# Patient Record
Sex: FEMALE | Race: WHITE | NOT HISPANIC OR LATINO | URBAN - METROPOLITAN AREA
[De-identification: names, ages, dates, MRNs, and addresses within clinical notes are randomized per-mention and may not be internally consistent; named-entity substitution may affect disease eponyms.]

---

## 2021-03-04 ENCOUNTER — OUTPATIENT (OUTPATIENT)
Dept: OUTPATIENT SERVICES | Facility: HOSPITAL | Age: 56
LOS: 1 days | End: 2021-03-04
Payer: COMMERCIAL

## 2021-03-04 VITALS
WEIGHT: 156.53 LBS | RESPIRATION RATE: 18 BRPM | DIASTOLIC BLOOD PRESSURE: 88 MMHG | SYSTOLIC BLOOD PRESSURE: 130 MMHG | HEIGHT: 64 IN | TEMPERATURE: 97 F | HEART RATE: 67 BPM

## 2021-03-04 DIAGNOSIS — F17.200 NICOTINE DEPENDENCE, UNSPECIFIED, UNCOMPLICATED: ICD-10-CM

## 2021-03-04 DIAGNOSIS — Z98.890 OTHER SPECIFIED POSTPROCEDURAL STATES: Chronic | ICD-10-CM

## 2021-03-04 DIAGNOSIS — Z90.721 ACQUIRED ABSENCE OF OVARIES, UNILATERAL: Chronic | ICD-10-CM

## 2021-03-04 DIAGNOSIS — Z01.818 ENCOUNTER FOR OTHER PREPROCEDURAL EXAMINATION: ICD-10-CM

## 2021-03-04 DIAGNOSIS — M48.061 SPINAL STENOSIS, LUMBAR REGION WITHOUT NEUROGENIC CLAUDICATION: ICD-10-CM

## 2021-03-04 DIAGNOSIS — R03.0 ELEVATED BLOOD-PRESSURE READING, WITHOUT DIAGNOSIS OF HYPERTENSION: ICD-10-CM

## 2021-03-04 DIAGNOSIS — Z29.9 ENCOUNTER FOR PROPHYLACTIC MEASURES, UNSPECIFIED: ICD-10-CM

## 2021-03-04 LAB
A1C WITH ESTIMATED AVERAGE GLUCOSE RESULT: 5.5 % — SIGNIFICANT CHANGE UP (ref 4–5.6)
ANION GAP SERPL CALC-SCNC: 15 MMOL/L — SIGNIFICANT CHANGE UP (ref 5–17)
APTT BLD: 34.9 SEC — SIGNIFICANT CHANGE UP (ref 27.5–35.5)
BASOPHILS # BLD AUTO: 0.12 K/UL — SIGNIFICANT CHANGE UP (ref 0–0.2)
BASOPHILS NFR BLD AUTO: 1.5 % — SIGNIFICANT CHANGE UP (ref 0–2)
BLD GP AB SCN SERPL QL: SIGNIFICANT CHANGE UP
BUN SERPL-MCNC: 15 MG/DL — SIGNIFICANT CHANGE UP (ref 8–20)
CALCIUM SERPL-MCNC: 9.9 MG/DL — SIGNIFICANT CHANGE UP (ref 8.6–10.2)
CHLORIDE SERPL-SCNC: 95 MMOL/L — LOW (ref 98–107)
CO2 SERPL-SCNC: 28 MMOL/L — SIGNIFICANT CHANGE UP (ref 22–29)
CREAT SERPL-MCNC: 0.62 MG/DL — SIGNIFICANT CHANGE UP (ref 0.5–1.3)
EOSINOPHIL # BLD AUTO: 0.34 K/UL — SIGNIFICANT CHANGE UP (ref 0–0.5)
EOSINOPHIL NFR BLD AUTO: 4.3 % — SIGNIFICANT CHANGE UP (ref 0–6)
ESTIMATED AVERAGE GLUCOSE: 111 MG/DL — SIGNIFICANT CHANGE UP (ref 68–114)
GLUCOSE SERPL-MCNC: 93 MG/DL — SIGNIFICANT CHANGE UP (ref 70–99)
HCT VFR BLD CALC: 46.7 % — HIGH (ref 34.5–45)
HGB BLD-MCNC: 14.7 G/DL — SIGNIFICANT CHANGE UP (ref 11.5–15.5)
IMM GRANULOCYTES NFR BLD AUTO: 0.3 % — SIGNIFICANT CHANGE UP (ref 0–1.5)
INR BLD: 0.91 RATIO — SIGNIFICANT CHANGE UP (ref 0.88–1.16)
LYMPHOCYTES # BLD AUTO: 3.43 K/UL — HIGH (ref 1–3.3)
LYMPHOCYTES # BLD AUTO: 43 % — SIGNIFICANT CHANGE UP (ref 13–44)
MCHC RBC-ENTMCNC: 29.8 PG — SIGNIFICANT CHANGE UP (ref 27–34)
MCHC RBC-ENTMCNC: 31.5 GM/DL — LOW (ref 32–36)
MCV RBC AUTO: 94.7 FL — SIGNIFICANT CHANGE UP (ref 80–100)
MONOCYTES # BLD AUTO: 0.55 K/UL — SIGNIFICANT CHANGE UP (ref 0–0.9)
MONOCYTES NFR BLD AUTO: 6.9 % — SIGNIFICANT CHANGE UP (ref 2–14)
MRSA PCR RESULT.: SIGNIFICANT CHANGE UP
NEUTROPHILS # BLD AUTO: 3.51 K/UL — SIGNIFICANT CHANGE UP (ref 1.8–7.4)
NEUTROPHILS NFR BLD AUTO: 44 % — SIGNIFICANT CHANGE UP (ref 43–77)
PLATELET # BLD AUTO: 267 K/UL — SIGNIFICANT CHANGE UP (ref 150–400)
POTASSIUM SERPL-MCNC: 4.6 MMOL/L — SIGNIFICANT CHANGE UP (ref 3.5–5.3)
POTASSIUM SERPL-SCNC: 4.6 MMOL/L — SIGNIFICANT CHANGE UP (ref 3.5–5.3)
PROTHROM AB SERPL-ACNC: 10.6 SEC — SIGNIFICANT CHANGE UP (ref 10.6–13.6)
RBC # BLD: 4.93 M/UL — SIGNIFICANT CHANGE UP (ref 3.8–5.2)
RBC # FLD: 12.9 % — SIGNIFICANT CHANGE UP (ref 10.3–14.5)
S AUREUS DNA NOSE QL NAA+PROBE: DETECTED
SODIUM SERPL-SCNC: 138 MMOL/L — SIGNIFICANT CHANGE UP (ref 135–145)
WBC # BLD: 7.97 K/UL — SIGNIFICANT CHANGE UP (ref 3.8–10.5)
WBC # FLD AUTO: 7.97 K/UL — SIGNIFICANT CHANGE UP (ref 3.8–10.5)

## 2021-03-04 PROCEDURE — 71046 X-RAY EXAM CHEST 2 VIEWS: CPT

## 2021-03-04 PROCEDURE — 71046 X-RAY EXAM CHEST 2 VIEWS: CPT | Mod: 26

## 2021-03-04 PROCEDURE — G0463: CPT

## 2021-03-04 PROCEDURE — 93010 ELECTROCARDIOGRAM REPORT: CPT

## 2021-03-04 PROCEDURE — 93005 ELECTROCARDIOGRAM TRACING: CPT
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
## 2021-03-04 NOTE — H&P PST ADULT - NSICDXPASTSURGICALHX_GEN_ALL_CORE_FT
PAST SURGICAL HISTORY:  H/O neck surgery     H/O unilateral oophorectomy     S/P lumbar spine operation 2015, 2017

## 2021-03-04 NOTE — H&P PST ADULT - NSICDXPROBLEM_GEN_ALL_CORE_FT
PROBLEM DIAGNOSES  Problem: Lumbar stenosis without neurogenic claudication  Assessment and Plan: Patient is scheduled for transforaminal lumbar interbody fusion, posterior spinal fsion, laminectomy L4-S1 with Dr Bakari Gonzalez on 3/18/21. Medical clearance pending    Problem: Elevated blood pressure reading  Assessment and Plan: /88, patient in 10/10 pain at time of visit, denies history of hypertension, bp recheck at medical clearance appt    Problem: Tobacco dependence  Assessment and Plan: Patient instructed to stop smoking 2 weeks prior to surgery, patient states she is speaking with her PCP regarding chantix, patient reports she is down to 3 cigarettes per day.  Urine metabolites pending. Lungs +wheezing right lower lobe, cxr pending    Problem: Need for prophylactic measure  Assessment and Plan: Caprini Score 4, at risk, surgical team to order appropriate vte prophylaxis

## 2021-03-04 NOTE — H&P PST ADULT - BACK COMMENTS
lumbar spine tender, decreased ROM with flexion/extension/ rotation/lateral bending  no cva tenderness

## 2021-03-04 NOTE — H&P PST ADULT - ANESTHESIA, PREVIOUS REACTION, PROFILE
'medically induced panic attawck 1 week after surgery in 2017'/none 'medically induced panic attack 1 week after surgery in 2017'/none
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
## 2021-03-05 RX ORDER — MUPIROCIN 20 MG/G
1 OINTMENT TOPICAL
Qty: 1 | Refills: 0
Start: 2021-03-05 | End: 2021-03-09

## 2021-03-06 PROBLEM — M48.061 SPINAL STENOSIS, LUMBAR REGION WITHOUT NEUROGENIC CLAUDICATION: Chronic | Status: ACTIVE | Noted: 2021-03-04

## 2021-03-08 RX ORDER — MUPIROCIN 20 MG/G
1 OINTMENT TOPICAL
Qty: 1 | Refills: 0
Start: 2021-03-08 | End: 2021-03-12

## 2021-03-09 LAB
ANABASINE UR-MCNC: SIGNIFICANT CHANGE UP NG/ML
COTININE UR-MCNC: >1200 NG/ML — HIGH
NICOTINE UR-MCNC: >1200 NG/ML — HIGH
NORNICOTINE UR-MCNC: 58 NG/ML — HIGH

## 2021-03-14 DIAGNOSIS — Z01.818 ENCOUNTER FOR OTHER PREPROCEDURAL EXAMINATION: ICD-10-CM

## 2021-03-14 PROBLEM — Z00.00 ENCOUNTER FOR PREVENTIVE HEALTH EXAMINATION: Status: ACTIVE | Noted: 2021-03-14

## 2021-03-15 ENCOUNTER — APPOINTMENT (OUTPATIENT)
Dept: DISASTER EMERGENCY | Facility: CLINIC | Age: 56
End: 2021-03-15

## 2021-03-16 ENCOUNTER — TRANSCRIPTION ENCOUNTER (OUTPATIENT)
Age: 56
End: 2021-03-16

## 2021-03-16 LAB — SARS-COV-2 N GENE NPH QL NAA+PROBE: NOT DETECTED

## 2021-03-17 ENCOUNTER — TRANSCRIPTION ENCOUNTER (OUTPATIENT)
Age: 56
End: 2021-03-17

## 2021-03-18 ENCOUNTER — TRANSCRIPTION ENCOUNTER (OUTPATIENT)
Age: 56
End: 2021-03-18

## 2021-03-18 ENCOUNTER — INPATIENT (INPATIENT)
Facility: HOSPITAL | Age: 56
LOS: 1 days | Discharge: HOME | DRG: 455 | End: 2021-03-20
Attending: SPECIALIST | Admitting: SPECIALIST
Payer: COMMERCIAL

## 2021-03-18 VITALS
TEMPERATURE: 97 F | DIASTOLIC BLOOD PRESSURE: 93 MMHG | SYSTOLIC BLOOD PRESSURE: 133 MMHG | OXYGEN SATURATION: 100 % | WEIGHT: 156.53 LBS | HEART RATE: 75 BPM | HEIGHT: 64 IN | RESPIRATION RATE: 18 BRPM

## 2021-03-18 DIAGNOSIS — M48.061 SPINAL STENOSIS, LUMBAR REGION WITHOUT NEUROGENIC CLAUDICATION: ICD-10-CM

## 2021-03-18 DIAGNOSIS — Z90.721 ACQUIRED ABSENCE OF OVARIES, UNILATERAL: Chronic | ICD-10-CM

## 2021-03-18 DIAGNOSIS — Z98.890 OTHER SPECIFIED POSTPROCEDURAL STATES: Chronic | ICD-10-CM

## 2021-03-18 LAB — ABO RH CONFIRMATION: SIGNIFICANT CHANGE UP

## 2021-03-18 RX ORDER — LANOLIN ALCOHOL/MO/W.PET/CERES
1 CREAM (GRAM) TOPICAL
Qty: 0 | Refills: 0 | DISCHARGE

## 2021-03-18 RX ORDER — CEFAZOLIN SODIUM 1 G
1000 VIAL (EA) INJECTION EVERY 8 HOURS
Refills: 0 | Status: DISCONTINUED | OUTPATIENT
Start: 2021-03-18 | End: 2021-03-20

## 2021-03-18 RX ORDER — ACETAMINOPHEN 500 MG
650 TABLET ORAL EVERY 6 HOURS
Refills: 0 | Status: DISCONTINUED | OUTPATIENT
Start: 2021-03-18 | End: 2021-03-20

## 2021-03-18 RX ORDER — OXYCODONE AND ACETAMINOPHEN 5; 325 MG/1; MG/1
1 TABLET ORAL
Qty: 0 | Refills: 0 | DISCHARGE

## 2021-03-18 RX ORDER — SODIUM CHLORIDE 9 MG/ML
3 INJECTION INTRAMUSCULAR; INTRAVENOUS; SUBCUTANEOUS EVERY 8 HOURS
Refills: 0 | Status: DISCONTINUED | OUTPATIENT
Start: 2021-03-18 | End: 2021-03-18

## 2021-03-18 RX ORDER — CHOLECALCIFEROL (VITAMIN D3) 125 MCG
0 CAPSULE ORAL
Qty: 0 | Refills: 0 | DISCHARGE

## 2021-03-18 RX ORDER — SENNA PLUS 8.6 MG/1
2 TABLET ORAL AT BEDTIME
Refills: 0 | Status: DISCONTINUED | OUTPATIENT
Start: 2021-03-18 | End: 2021-03-20

## 2021-03-18 RX ORDER — LANOLIN ALCOHOL/MO/W.PET/CERES
10 CREAM (GRAM) TOPICAL AT BEDTIME
Refills: 0 | Status: DISCONTINUED | OUTPATIENT
Start: 2021-03-18 | End: 2021-03-20

## 2021-03-18 RX ORDER — HYDROMORPHONE HYDROCHLORIDE 2 MG/ML
30 INJECTION INTRAMUSCULAR; INTRAVENOUS; SUBCUTANEOUS
Refills: 0 | Status: DISCONTINUED | OUTPATIENT
Start: 2021-03-18 | End: 2021-03-19

## 2021-03-18 RX ORDER — DULOXETINE HYDROCHLORIDE 30 MG/1
40 CAPSULE, DELAYED RELEASE ORAL
Qty: 0 | Refills: 0 | DISCHARGE

## 2021-03-18 RX ORDER — CEFAZOLIN SODIUM 1 G
2000 VIAL (EA) INJECTION ONCE
Refills: 0 | Status: DISCONTINUED | OUTPATIENT
Start: 2021-03-18 | End: 2021-03-18

## 2021-03-18 RX ORDER — ONDANSETRON 8 MG/1
4 TABLET, FILM COATED ORAL EVERY 6 HOURS
Refills: 0 | Status: DISCONTINUED | OUTPATIENT
Start: 2021-03-18 | End: 2021-03-20

## 2021-03-18 RX ORDER — SODIUM CHLORIDE 9 MG/ML
1000 INJECTION, SOLUTION INTRAVENOUS
Refills: 0 | Status: DISCONTINUED | OUTPATIENT
Start: 2021-03-18 | End: 2021-03-18

## 2021-03-18 RX ORDER — NALOXONE HYDROCHLORIDE 4 MG/.1ML
0.1 SPRAY NASAL
Refills: 0 | Status: DISCONTINUED | OUTPATIENT
Start: 2021-03-18 | End: 2021-03-20

## 2021-03-18 RX ORDER — SODIUM CHLORIDE 9 MG/ML
1000 INJECTION, SOLUTION INTRAVENOUS
Refills: 0 | Status: DISCONTINUED | OUTPATIENT
Start: 2021-03-18 | End: 2021-03-20

## 2021-03-18 RX ORDER — DULOXETINE HYDROCHLORIDE 30 MG/1
40 CAPSULE, DELAYED RELEASE ORAL DAILY
Refills: 0 | Status: DISCONTINUED | OUTPATIENT
Start: 2021-03-18 | End: 2021-03-20

## 2021-03-18 RX ORDER — HYDROMORPHONE HYDROCHLORIDE 2 MG/ML
0.5 INJECTION INTRAMUSCULAR; INTRAVENOUS; SUBCUTANEOUS
Refills: 0 | Status: DISCONTINUED | OUTPATIENT
Start: 2021-03-18 | End: 2021-03-18

## 2021-03-18 RX ADMIN — HYDROMORPHONE HYDROCHLORIDE 0.5 MILLIGRAM(S): 2 INJECTION INTRAMUSCULAR; INTRAVENOUS; SUBCUTANEOUS at 18:00

## 2021-03-18 RX ADMIN — HYDROMORPHONE HYDROCHLORIDE 30 MILLILITER(S): 2 INJECTION INTRAMUSCULAR; INTRAVENOUS; SUBCUTANEOUS at 17:00

## 2021-03-18 RX ADMIN — Medication 100 MILLIGRAM(S): at 23:03

## 2021-03-18 RX ADMIN — HYDROMORPHONE HYDROCHLORIDE 30 MILLILITER(S): 2 INJECTION INTRAMUSCULAR; INTRAVENOUS; SUBCUTANEOUS at 18:41

## 2021-03-18 RX ADMIN — SENNA PLUS 2 TABLET(S): 8.6 TABLET ORAL at 23:02

## 2021-03-18 RX ADMIN — Medication 10 MILLIGRAM(S): at 23:02

## 2021-03-18 RX ADMIN — HYDROMORPHONE HYDROCHLORIDE 0.5 MILLIGRAM(S): 2 INJECTION INTRAMUSCULAR; INTRAVENOUS; SUBCUTANEOUS at 16:59

## 2021-03-18 RX ADMIN — HYDROMORPHONE HYDROCHLORIDE 30 MILLILITER(S): 2 INJECTION INTRAMUSCULAR; INTRAVENOUS; SUBCUTANEOUS at 21:56

## 2021-03-18 RX ADMIN — HYDROMORPHONE HYDROCHLORIDE 30 MILLILITER(S): 2 INJECTION INTRAMUSCULAR; INTRAVENOUS; SUBCUTANEOUS at 19:43

## 2021-03-18 NOTE — BRIEF OPERATIVE NOTE - NSICDXBRIEFPREOP_GEN_ALL_CORE_FT
PRE-OP DIAGNOSIS:  Lumbar spondylosis 18-Mar-2021 15:57:59  Papa Gamboa  Recurrent herniation of lumbar disc 18-Mar-2021 15:57:29  Papa Gamboa

## 2021-03-18 NOTE — BRIEF OPERATIVE NOTE - NSICDXBRIEFPROCEDURE_GEN_ALL_CORE_FT
PROCEDURES:  Revision of lumbar laminectomy 18-Mar-2021 15:59:11  Papa Gamboa  Fusion, spine, lumbar, TLIF, 1-2 levels 18-Mar-2021 15:58:42  Papa Gamboa

## 2021-03-18 NOTE — DISCHARGE NOTE PROVIDER - HOSPITAL COURSE
Patient was admitted for elective revision lumbar laminectomy, transforaminal lumbar interbody fusion on 3/18 for failed conservative treatment of persistent lower back pain, The hospital post operative course was uneventful.  The surgical dressing was changed and the drain was removed uneventfully.   PT/OT worked with patient for gait training.   Chronic medical conditions were treated during the hospital stay. Patient was followed by Pain Management for pain control. Pain was now adequately controlled and patient was discharged home in stable condition.

## 2021-03-18 NOTE — PROGRESS NOTE ADULT - SUBJECTIVE AND OBJECTIVE BOX
SHIKHA [id number]    55yFemale s/p TLIF L4-S1, POD#0    Spinal stenosis of lumbar region without neurogenic claudication                SUBJECTIVE: Patient seen and examined at the bedside. Patient reports significant pain to lower back. Patient moving in bed constantly stating she can't get comfortable. Of note, when I initially arrived patient was sitting comfortably eating and started moving upon my arrival. Patient stating PCA pump is not controlling her pain currently. Pain management on board. Patient has not ambulated with PT yet. Patient states she gets pain that radiates up her left shin as per baseline, unchanged post-operatively. Patient denies acute motor or sensory changes. Denies CP, SOB, dizziness, HA.       OBJECTIVE:   Vital Signs Last 24 Hrs  T(C): 36.4 (18 Mar 2021 19:35), Max: 36.6 (18 Mar 2021 16:23)  T(F): 97.5 (18 Mar 2021 19:35), Max: 97.9 (18 Mar 2021 16:23)  HR: 94 (18 Mar 2021 19:35) (75 - 94)  BP: 119/74 (18 Mar 2021 19:35) (97/78 - 133/93)  BP(mean): --  RR: 18 (18 Mar 2021 19:35) (13 - 20)  SpO2: 93% (18 Mar 2021 19:35) (93% - 100%)\par       PE:  Constitutional: Alert, awake, appears uncomfortable, can't sit still    Spine:          Dressing: [x] clean/dry/intact           Drains:  HV drain present with bloody fluid in chamber           Sensation: SILT b/l L2-S1                     Motor: exam limited secondary to pain                                                         HF(l2)   KE(l3)    TA(l4)   EHL(l5)    GS(s1)                                                 R        4/5        5/5        5/5       5/5         5/5                                               L         4/5        5/5       5/5       5/5          5/5                                                 B/L LE: warm well perfused; capillary refill <3 seconds. BCR. Calves soft NT          A/P :  55y Female S/P TLIF L4-S1,  POD#0  -    Pain control as per pain management, PCA  -    DVT ppx: [x]SCDs[ ] Pharmacolgic    -    Check AM labs    -    Monitor Drain Output, ABX until drain removed  -    Dinh removal after PT in AM  -    Resume home meds  -    Physical Therapy  -    Weight bearing status: WBAT  -    Dispo planning

## 2021-03-18 NOTE — BRIEF OPERATIVE NOTE - NSICDXBRIEFPOSTOP_GEN_ALL_CORE_FT
POST-OP DIAGNOSIS:  Lumbar spondylosis 18-Mar-2021 15:58:14  Papa Gamboa  Recurrent herniation of lumbar disc 18-Mar-2021 15:57:42  Papa Gamboa

## 2021-03-18 NOTE — DISCHARGE NOTE PROVIDER - NSDCMRMEDTOKEN_GEN_ALL_CORE_FT
Cymbalta: 40 milligram(s) orally once a day  Melatonin 10 mg oral capsule: 1 cap(s) orally once a day (at bedtime)  mupirocin 2% topical ointment: Apply topically to affected area 2 times a day   Percocet 5 mg-325 mg oral tablet: 1 tab(s) orally every 6 hours  Vitamin D3 5000 intl units (125 mcg) oral capsule:

## 2021-03-18 NOTE — DISCHARGE NOTE PROVIDER - NSDCCPCAREPLAN_GEN_ALL_CORE_FT
PRINCIPAL DISCHARGE DIAGNOSIS  Diagnosis: Lumbar stenosis without neurogenic claudication  Assessment and Plan of Treatment:

## 2021-03-18 NOTE — DISCHARGE NOTE PROVIDER - NSDCCPTREATMENT_GEN_ALL_CORE_FT
PRINCIPAL PROCEDURE  Procedure: Revision of lumbar laminectomy  Findings and Treatment:       SECONDARY PROCEDURE  Procedure: Fusion, spine, lumbar, TLIF, 1-2 levels  Findings and Treatment:

## 2021-03-18 NOTE — DISCHARGE NOTE PROVIDER - NSDCFUADDINST_GEN_ALL_CORE_FT
Follow-up with Dr. Gonzalez within 7-10 days. Dressing change daily until dry, then leave dressing in place until office follow-up.  Showering at 48 hour is permitted.  Pain medications as indicated and titrated to patient's needs. Ambulate with assistance of walker. Contact office if the wound becomes red, opens or discharge increases.

## 2021-03-19 DIAGNOSIS — G89.18 OTHER ACUTE POSTPROCEDURAL PAIN: ICD-10-CM

## 2021-03-19 LAB
ANION GAP SERPL CALC-SCNC: 12 MMOL/L — SIGNIFICANT CHANGE UP (ref 5–17)
BASOPHILS # BLD AUTO: 0 K/UL — SIGNIFICANT CHANGE UP (ref 0–0.2)
BASOPHILS NFR BLD AUTO: 0 % — SIGNIFICANT CHANGE UP (ref 0–2)
BUN SERPL-MCNC: 9 MG/DL — SIGNIFICANT CHANGE UP (ref 8–20)
CALCIUM SERPL-MCNC: 8.5 MG/DL — LOW (ref 8.6–10.2)
CHLORIDE SERPL-SCNC: 100 MMOL/L — SIGNIFICANT CHANGE UP (ref 98–107)
CO2 SERPL-SCNC: 27 MMOL/L — SIGNIFICANT CHANGE UP (ref 22–29)
CREAT SERPL-MCNC: 0.48 MG/DL — LOW (ref 0.5–1.3)
EOSINOPHIL # BLD AUTO: 0 K/UL — SIGNIFICANT CHANGE UP (ref 0–0.5)
EOSINOPHIL NFR BLD AUTO: 0 % — SIGNIFICANT CHANGE UP (ref 0–6)
GIANT PLATELETS BLD QL SMEAR: PRESENT — SIGNIFICANT CHANGE UP
GLUCOSE SERPL-MCNC: 113 MG/DL — HIGH (ref 70–99)
HCT VFR BLD CALC: 37.6 % — SIGNIFICANT CHANGE UP (ref 34.5–45)
HGB BLD-MCNC: 12.1 G/DL — SIGNIFICANT CHANGE UP (ref 11.5–15.5)
LYMPHOCYTES # BLD AUTO: 1.31 K/UL — SIGNIFICANT CHANGE UP (ref 1–3.3)
LYMPHOCYTES # BLD AUTO: 8.7 % — LOW (ref 13–44)
MANUAL SMEAR VERIFICATION: SIGNIFICANT CHANGE UP
MCHC RBC-ENTMCNC: 30.5 PG — SIGNIFICANT CHANGE UP (ref 27–34)
MCHC RBC-ENTMCNC: 32.2 GM/DL — SIGNIFICANT CHANGE UP (ref 32–36)
MCV RBC AUTO: 94.7 FL — SIGNIFICANT CHANGE UP (ref 80–100)
MONOCYTES # BLD AUTO: 0.78 K/UL — SIGNIFICANT CHANGE UP (ref 0–0.9)
MONOCYTES NFR BLD AUTO: 5.2 % — SIGNIFICANT CHANGE UP (ref 2–14)
NEUTROPHILS # BLD AUTO: 12.68 K/UL — HIGH (ref 1.8–7.4)
NEUTROPHILS NFR BLD AUTO: 84.4 % — HIGH (ref 43–77)
PLAT MORPH BLD: NORMAL — SIGNIFICANT CHANGE UP
PLATELET # BLD AUTO: 247 K/UL — SIGNIFICANT CHANGE UP (ref 150–400)
POTASSIUM SERPL-MCNC: 4.1 MMOL/L — SIGNIFICANT CHANGE UP (ref 3.5–5.3)
POTASSIUM SERPL-SCNC: 4.1 MMOL/L — SIGNIFICANT CHANGE UP (ref 3.5–5.3)
RBC # BLD: 3.97 M/UL — SIGNIFICANT CHANGE UP (ref 3.8–5.2)
RBC # FLD: 13.2 % — SIGNIFICANT CHANGE UP (ref 10.3–14.5)
RBC BLD AUTO: NORMAL — SIGNIFICANT CHANGE UP
SODIUM SERPL-SCNC: 139 MMOL/L — SIGNIFICANT CHANGE UP (ref 135–145)
VARIANT LYMPHS # BLD: 1.7 % — SIGNIFICANT CHANGE UP (ref 0–6)
WBC # BLD: 15.02 K/UL — HIGH (ref 3.8–10.5)
WBC # FLD AUTO: 15.02 K/UL — HIGH (ref 3.8–10.5)

## 2021-03-19 RX ORDER — HYDROMORPHONE HYDROCHLORIDE 2 MG/ML
0.5 INJECTION INTRAMUSCULAR; INTRAVENOUS; SUBCUTANEOUS
Refills: 0 | Status: DISCONTINUED | OUTPATIENT
Start: 2021-03-19 | End: 2021-03-20

## 2021-03-19 RX ORDER — OXYCODONE HYDROCHLORIDE 5 MG/1
5 TABLET ORAL
Refills: 0 | Status: DISCONTINUED | OUTPATIENT
Start: 2021-03-19 | End: 2021-03-20

## 2021-03-19 RX ORDER — OXYCODONE HYDROCHLORIDE 5 MG/1
10 TABLET ORAL
Refills: 0 | Status: DISCONTINUED | OUTPATIENT
Start: 2021-03-19 | End: 2021-03-20

## 2021-03-19 RX ORDER — HYDROMORPHONE HYDROCHLORIDE 2 MG/ML
4 INJECTION INTRAMUSCULAR; INTRAVENOUS; SUBCUTANEOUS
Refills: 0 | Status: DISCONTINUED | OUTPATIENT
Start: 2021-03-19 | End: 2021-03-20

## 2021-03-19 RX ADMIN — HYDROMORPHONE HYDROCHLORIDE 0.5 MILLIGRAM(S): 2 INJECTION INTRAMUSCULAR; INTRAVENOUS; SUBCUTANEOUS at 10:55

## 2021-03-19 RX ADMIN — HYDROMORPHONE HYDROCHLORIDE 0.5 MILLIGRAM(S): 2 INJECTION INTRAMUSCULAR; INTRAVENOUS; SUBCUTANEOUS at 18:28

## 2021-03-19 RX ADMIN — HYDROMORPHONE HYDROCHLORIDE 4 MILLIGRAM(S): 2 INJECTION INTRAMUSCULAR; INTRAVENOUS; SUBCUTANEOUS at 08:57

## 2021-03-19 RX ADMIN — SODIUM CHLORIDE 100 MILLILITER(S): 9 INJECTION, SOLUTION INTRAVENOUS at 05:42

## 2021-03-19 RX ADMIN — HYDROMORPHONE HYDROCHLORIDE 0.5 MILLIGRAM(S): 2 INJECTION INTRAMUSCULAR; INTRAVENOUS; SUBCUTANEOUS at 22:00

## 2021-03-19 RX ADMIN — HYDROMORPHONE HYDROCHLORIDE 4 MILLIGRAM(S): 2 INJECTION INTRAMUSCULAR; INTRAVENOUS; SUBCUTANEOUS at 20:09

## 2021-03-19 RX ADMIN — HYDROMORPHONE HYDROCHLORIDE 0.5 MILLIGRAM(S): 2 INJECTION INTRAMUSCULAR; INTRAVENOUS; SUBCUTANEOUS at 21:47

## 2021-03-19 RX ADMIN — Medication 100 MILLIGRAM(S): at 05:42

## 2021-03-19 RX ADMIN — HYDROMORPHONE HYDROCHLORIDE 4 MILLIGRAM(S): 2 INJECTION INTRAMUSCULAR; INTRAVENOUS; SUBCUTANEOUS at 23:47

## 2021-03-19 RX ADMIN — DULOXETINE HYDROCHLORIDE 40 MILLIGRAM(S): 30 CAPSULE, DELAYED RELEASE ORAL at 02:28

## 2021-03-19 RX ADMIN — HYDROMORPHONE HYDROCHLORIDE 4 MILLIGRAM(S): 2 INJECTION INTRAMUSCULAR; INTRAVENOUS; SUBCUTANEOUS at 17:00

## 2021-03-19 RX ADMIN — HYDROMORPHONE HYDROCHLORIDE 0.5 MILLIGRAM(S): 2 INJECTION INTRAMUSCULAR; INTRAVENOUS; SUBCUTANEOUS at 10:11

## 2021-03-19 RX ADMIN — HYDROMORPHONE HYDROCHLORIDE 0.5 MILLIGRAM(S): 2 INJECTION INTRAMUSCULAR; INTRAVENOUS; SUBCUTANEOUS at 14:48

## 2021-03-19 RX ADMIN — HYDROMORPHONE HYDROCHLORIDE 4 MILLIGRAM(S): 2 INJECTION INTRAMUSCULAR; INTRAVENOUS; SUBCUTANEOUS at 09:35

## 2021-03-19 RX ADMIN — HYDROMORPHONE HYDROCHLORIDE 30 MILLILITER(S): 2 INJECTION INTRAMUSCULAR; INTRAVENOUS; SUBCUTANEOUS at 07:22

## 2021-03-19 RX ADMIN — Medication 100 MILLIGRAM(S): at 21:47

## 2021-03-19 RX ADMIN — SENNA PLUS 2 TABLET(S): 8.6 TABLET ORAL at 21:47

## 2021-03-19 RX ADMIN — HYDROMORPHONE HYDROCHLORIDE 4 MILLIGRAM(S): 2 INJECTION INTRAMUSCULAR; INTRAVENOUS; SUBCUTANEOUS at 21:00

## 2021-03-19 RX ADMIN — HYDROMORPHONE HYDROCHLORIDE 4 MILLIGRAM(S): 2 INJECTION INTRAMUSCULAR; INTRAVENOUS; SUBCUTANEOUS at 13:15

## 2021-03-19 RX ADMIN — HYDROMORPHONE HYDROCHLORIDE 4 MILLIGRAM(S): 2 INJECTION INTRAMUSCULAR; INTRAVENOUS; SUBCUTANEOUS at 12:32

## 2021-03-19 RX ADMIN — HYDROMORPHONE HYDROCHLORIDE 4 MILLIGRAM(S): 2 INJECTION INTRAMUSCULAR; INTRAVENOUS; SUBCUTANEOUS at 18:08

## 2021-03-19 RX ADMIN — HYDROMORPHONE HYDROCHLORIDE 0.5 MILLIGRAM(S): 2 INJECTION INTRAMUSCULAR; INTRAVENOUS; SUBCUTANEOUS at 15:20

## 2021-03-19 RX ADMIN — Medication 10 MILLIGRAM(S): at 21:47

## 2021-03-19 RX ADMIN — Medication 100 MILLIGRAM(S): at 14:48

## 2021-03-19 RX ADMIN — HYDROMORPHONE HYDROCHLORIDE 0.5 MILLIGRAM(S): 2 INJECTION INTRAMUSCULAR; INTRAVENOUS; SUBCUTANEOUS at 19:07
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
## 2021-03-19 NOTE — PHYSICAL THERAPY INITIAL EVALUATION ADULT - CRITERIA FOR SKILLED THERAPEUTIC INTERVENTIONS
Home, continue to ambulate as tolerated, Outpatient PT when medically appropriate/anticipated discharge recommendation

## 2021-03-19 NOTE — PROGRESS NOTE ADULT - SUBJECTIVE AND OBJECTIVE BOX
SHIKHA AHUJA    [id number]    POST OPERATIVE DAY #: 1  STATUS POST: TLIF L4-S1               SUBJECTIVE: Patient seen and examined  at bedside, pt laying on side in bed and reports she is comfortable, pain is under control. Pt reports she has been up and ambulating without difficulty. Pt has PCA at this time, as well as head catheter. Pt read to transition to PO pain meds and d/c PCA. Pt denies any new onset of numbness/tingling, or motor deficits. Pt reports improvements s/p surgery to radicular LLE pain. No other complaints at this time.    OBJECTIVE:     Vital Signs Last 24 Hrs  T(C): 36.9 (19 Mar 2021 05:25), Max: 36.9 (19 Mar 2021 00:10)  T(F): 98.4 (19 Mar 2021 05:25), Max: 98.4 (19 Mar 2021 00:10)  HR: 92 (19 Mar 2021 05:25) (75 - 94)  BP: 114/71 (19 Mar 2021 05:25) (97/78 - 133/93)  BP(mean): --  RR: 18 (19 Mar 2021 05:25) (13 - 20)  SpO2: 94% (19 Mar 2021 05:25) (93% - 100%)  I&O's Summary    18 Mar 2021 07:01  -  19 Mar 2021 07:00  --------------------------------------------------------  IN: 0 mL / OUT: 1355 mL / NET: -1355 mL        Constitutional:Alert, responsive, in no acute distress.     Abdominal: soft and supple non distended    Lympatics: no pretibial pitting edema    Spine:          Dressing: clean/dry/intact          Drains:  present with OUTPUT of          Sensation: SILT throughout            Motor exam:                   Upper extremity               Bi(c5)  WE(c6)  EE(c7)   FF(c8)                                                R         5/5        5/5        5/5       5/5                                               L          5/5        5/5        5/5       5/5                     Lower extremeity          HF(l2)   KE(l3)    TA(l4)   EHL(l5)  GS(s1)                                                 R        5/5        5/5        5/5       5/5         5/5                                               L         5/5        5/5       5/5       5/5          5/5                                                         Vascular:  warm well perfused; capillary refill <3 seconds                                                              LABS:                        12.1   15.02 )-----------( 247      ( 19 Mar 2021 06:40 )             37.6       03-19    139  |  100  |  9.0  ----------------------------<  113<H>  4.1   |  27.0  |  0.48<L>    Ca    8.5<L>      19 Mar 2021 06:40          RADIOLOGY & ADDITIONAL STUDIES:      A/P :  55y Female S/P  TLIF L4-S1  POD#1  -     d/c head  -    Pain control - PCA disconitnued, PO pain meds ordered  -    DVT ppx: SCDs  -    Periop abx:  Ancef q 8 hours until HV drain removed  -    ADAT  -    Check AM labs    -    Monitor Drain Output - 100 cc / 8 hours overnight, cont. HV drain  -    Resume home meds  -    Physical Therapy  -    Weight bearing status: WBAT  -    Dispo: Home

## 2021-03-19 NOTE — PROGRESS NOTE ADULT - SUBJECTIVE AND OBJECTIVE BOX
Patient seen today 3/19/2021 POD#1 s/p TLIF L4-S1. Reports incisional back pain, improved lower extremity radicular pain. AVSS. Dressing intact, drain functional. Moves bilateral lower extremities to command with good strength.  Plan: D/c head. Continue hemovac. Check h/h. Incentive spirometer. Mechanical dvt prophylaxis. Transition pca to po analgesics. OOB to chair, ambulate wbat with PT.

## 2021-03-19 NOTE — CONSULT NOTE ADULT - ASSESSMENT
55 year old female with a history lumbar stenosis  s/p transforaminal lumbar interbody fusion, posterior spinal fusion, laminectomy L4-S1 with Dr Bakari Gonzalez on 3/18/21    Pain controlled  Pain service will sign off  Please reconsult as needed     Cont:  DULoxetine 40 milliGRAM(s) Oral daily  HYDROmorphone   Tablet 4 milliGRAM(s) Oral every 3 hours PRN  HYDROmorphone  Injectable 0.5 milliGRAM(s) IV Push every 3 hours PRN  oxyCODONE    IR 5 milliGRAM(s) Oral every 3 hours PRN  oxyCODONE    IR 10 milliGRAM(s) Oral every 3 hours PRN    If patient has no bowel movement, would recommend to order miralax in addition to senna    If pain becomes uncontrolled:  - Recommend changing acetaminophen PO 650mg [id number] to standing. HOLD for liver function test dysfunction  - Recommend starting gabapentin PO 300mg TID standing. HOLD for sedation  - If no surgical contraindication, recommend starting ketorolac IV 30mg [id number] standing x 4 doses. Can use celebrex 200mg PO q12h, with food thereafter.  - Recommend starting robaxin 500mg qid standing. HOLD for sedation  
55 year old female with a history lumbar stenosis  s/p transforaminal lumbar interbody fusion, posterior spinal fusion, laminectomy L4-S1 with Dr Bakari Gonzalez on 3/18/21    Pain service will follow    Postop Recommendations:  - Recommend starting oxycodone PO 5mg/10mg [id number] PRN mod/severe pain; hydromorphone IVP 0.3mg q3hour PRN breakthrough pain. HOLD for sedation  - Recommend starting acetaminophen PO 975mg [id number] standing. HOLD for liver function test dysfunction  - Recommend starting gabapentin PO 300mg TID standing. HOLD for sedation  - If no surgical contraindication, recommend starting ketorolac IV 30mg [id number] standing x 4 doses. Can use celebrex 200mg PO q12h, with food thereafter.  - Recommend starting flexeril 5mg q8h standing. HOLD for sedation   - Start home cymbalta qhs

## 2021-03-19 NOTE — PHYSICAL THERAPY INITIAL EVALUATION ADULT - ADDITIONAL COMMENTS
Pt lives in a private home with her spouse. 1 step to enter, 0 steps inside Pt was independent PTA without assist device. Pt does not own DME.

## 2021-03-20 ENCOUNTER — TRANSCRIPTION ENCOUNTER (OUTPATIENT)
Age: 56
End: 2021-03-20

## 2021-03-20 VITALS
DIASTOLIC BLOOD PRESSURE: 78 MMHG | TEMPERATURE: 98 F | HEART RATE: 75 BPM | OXYGEN SATURATION: 95 % | SYSTOLIC BLOOD PRESSURE: 109 MMHG | RESPIRATION RATE: 18 BRPM

## 2021-03-20 LAB
ANION GAP SERPL CALC-SCNC: 9 MMOL/L — SIGNIFICANT CHANGE UP (ref 5–17)
BASOPHILS # BLD AUTO: 0.08 K/UL — SIGNIFICANT CHANGE UP (ref 0–0.2)
BASOPHILS NFR BLD AUTO: 0.8 % — SIGNIFICANT CHANGE UP (ref 0–2)
BUN SERPL-MCNC: 10 MG/DL — SIGNIFICANT CHANGE UP (ref 8–20)
CALCIUM SERPL-MCNC: 8.4 MG/DL — LOW (ref 8.6–10.2)
CHLORIDE SERPL-SCNC: 98 MMOL/L — SIGNIFICANT CHANGE UP (ref 98–107)
CO2 SERPL-SCNC: 30 MMOL/L — HIGH (ref 22–29)
CREAT SERPL-MCNC: 0.44 MG/DL — LOW (ref 0.5–1.3)
EOSINOPHIL # BLD AUTO: 0.33 K/UL — SIGNIFICANT CHANGE UP (ref 0–0.5)
EOSINOPHIL NFR BLD AUTO: 3.2 % — SIGNIFICANT CHANGE UP (ref 0–6)
GLUCOSE SERPL-MCNC: 98 MG/DL — SIGNIFICANT CHANGE UP (ref 70–99)
HCT VFR BLD CALC: 32.8 % — LOW (ref 34.5–45)
HGB BLD-MCNC: 10.5 G/DL — LOW (ref 11.5–15.5)
IMM GRANULOCYTES NFR BLD AUTO: 0.3 % — SIGNIFICANT CHANGE UP (ref 0–1.5)
LYMPHOCYTES # BLD AUTO: 2.68 K/UL — SIGNIFICANT CHANGE UP (ref 1–3.3)
LYMPHOCYTES # BLD AUTO: 26.2 % — SIGNIFICANT CHANGE UP (ref 13–44)
MCHC RBC-ENTMCNC: 30.6 PG — SIGNIFICANT CHANGE UP (ref 27–34)
MCHC RBC-ENTMCNC: 32 GM/DL — SIGNIFICANT CHANGE UP (ref 32–36)
MCV RBC AUTO: 95.6 FL — SIGNIFICANT CHANGE UP (ref 80–100)
MONOCYTES # BLD AUTO: 0.83 K/UL — SIGNIFICANT CHANGE UP (ref 0–0.9)
MONOCYTES NFR BLD AUTO: 8.1 % — SIGNIFICANT CHANGE UP (ref 2–14)
NEUTROPHILS # BLD AUTO: 6.29 K/UL — SIGNIFICANT CHANGE UP (ref 1.8–7.4)
NEUTROPHILS NFR BLD AUTO: 61.4 % — SIGNIFICANT CHANGE UP (ref 43–77)
PLATELET # BLD AUTO: 188 K/UL — SIGNIFICANT CHANGE UP (ref 150–400)
POTASSIUM SERPL-MCNC: 3.6 MMOL/L — SIGNIFICANT CHANGE UP (ref 3.5–5.3)
POTASSIUM SERPL-SCNC: 3.6 MMOL/L — SIGNIFICANT CHANGE UP (ref 3.5–5.3)
RBC # BLD: 3.43 M/UL — LOW (ref 3.8–5.2)
RBC # FLD: 13.2 % — SIGNIFICANT CHANGE UP (ref 10.3–14.5)
SODIUM SERPL-SCNC: 137 MMOL/L — SIGNIFICANT CHANGE UP (ref 135–145)
WBC # BLD: 10.24 K/UL — SIGNIFICANT CHANGE UP (ref 3.8–10.5)
WBC # FLD AUTO: 10.24 K/UL — SIGNIFICANT CHANGE UP (ref 3.8–10.5)

## 2021-03-20 RX ORDER — SENNOSIDES/DOCUSATE SODIUM 8.6MG-50MG
2 TABLET ORAL
Qty: 20 | Refills: 0
Start: 2021-03-20 | End: 2021-03-29

## 2021-03-20 RX ORDER — METHOCARBAMOL 500 MG/1
2 TABLET, FILM COATED ORAL
Qty: 0 | Refills: 0 | DISCHARGE
Start: 2021-03-20 | End: 2021-03-25

## 2021-03-20 RX ORDER — ACETAMINOPHEN 500 MG
2 TABLET ORAL
Qty: 0 | Refills: 0 | DISCHARGE
Start: 2021-03-20

## 2021-03-20 RX ORDER — METHOCARBAMOL 500 MG/1
500 TABLET, FILM COATED ORAL
Refills: 0 | Status: DISCONTINUED | OUTPATIENT
Start: 2021-03-20 | End: 2021-03-20

## 2021-03-20 RX ORDER — METHOCARBAMOL 500 MG/1
500 TABLET, FILM COATED ORAL ONCE
Refills: 0 | Status: COMPLETED | OUTPATIENT
Start: 2021-03-20 | End: 2021-03-20

## 2021-03-20 RX ADMIN — HYDROMORPHONE HYDROCHLORIDE 0.5 MILLIGRAM(S): 2 INJECTION INTRAMUSCULAR; INTRAVENOUS; SUBCUTANEOUS at 13:32

## 2021-03-20 RX ADMIN — HYDROMORPHONE HYDROCHLORIDE 4 MILLIGRAM(S): 2 INJECTION INTRAMUSCULAR; INTRAVENOUS; SUBCUTANEOUS at 07:17

## 2021-03-20 RX ADMIN — HYDROMORPHONE HYDROCHLORIDE 0.5 MILLIGRAM(S): 2 INJECTION INTRAMUSCULAR; INTRAVENOUS; SUBCUTANEOUS at 00:59

## 2021-03-20 RX ADMIN — HYDROMORPHONE HYDROCHLORIDE 0.5 MILLIGRAM(S): 2 INJECTION INTRAMUSCULAR; INTRAVENOUS; SUBCUTANEOUS at 04:45

## 2021-03-20 RX ADMIN — HYDROMORPHONE HYDROCHLORIDE 4 MILLIGRAM(S): 2 INJECTION INTRAMUSCULAR; INTRAVENOUS; SUBCUTANEOUS at 02:46

## 2021-03-20 RX ADMIN — HYDROMORPHONE HYDROCHLORIDE 0.5 MILLIGRAM(S): 2 INJECTION INTRAMUSCULAR; INTRAVENOUS; SUBCUTANEOUS at 08:50

## 2021-03-20 RX ADMIN — HYDROMORPHONE HYDROCHLORIDE 4 MILLIGRAM(S): 2 INJECTION INTRAMUSCULAR; INTRAVENOUS; SUBCUTANEOUS at 06:54

## 2021-03-20 RX ADMIN — HYDROMORPHONE HYDROCHLORIDE 0.5 MILLIGRAM(S): 2 INJECTION INTRAMUSCULAR; INTRAVENOUS; SUBCUTANEOUS at 01:15

## 2021-03-20 RX ADMIN — HYDROMORPHONE HYDROCHLORIDE 0.5 MILLIGRAM(S): 2 INJECTION INTRAMUSCULAR; INTRAVENOUS; SUBCUTANEOUS at 13:14

## 2021-03-20 RX ADMIN — HYDROMORPHONE HYDROCHLORIDE 0.5 MILLIGRAM(S): 2 INJECTION INTRAMUSCULAR; INTRAVENOUS; SUBCUTANEOUS at 09:15

## 2021-03-20 RX ADMIN — Medication 100 MILLIGRAM(S): at 06:54

## 2021-03-20 RX ADMIN — Medication 100 MILLIGRAM(S): at 13:24

## 2021-03-20 RX ADMIN — METHOCARBAMOL 500 MILLIGRAM(S): 500 TABLET, FILM COATED ORAL at 01:27

## 2021-03-20 RX ADMIN — METHOCARBAMOL 500 MILLIGRAM(S): 500 TABLET, FILM COATED ORAL at 10:30

## 2021-03-20 RX ADMIN — HYDROMORPHONE HYDROCHLORIDE 4 MILLIGRAM(S): 2 INJECTION INTRAMUSCULAR; INTRAVENOUS; SUBCUTANEOUS at 03:40

## 2021-03-20 RX ADMIN — HYDROMORPHONE HYDROCHLORIDE 4 MILLIGRAM(S): 2 INJECTION INTRAMUSCULAR; INTRAVENOUS; SUBCUTANEOUS at 10:31

## 2021-03-20 RX ADMIN — HYDROMORPHONE HYDROCHLORIDE 0.5 MILLIGRAM(S): 2 INJECTION INTRAMUSCULAR; INTRAVENOUS; SUBCUTANEOUS at 04:39

## 2021-03-20 RX ADMIN — DULOXETINE HYDROCHLORIDE 40 MILLIGRAM(S): 30 CAPSULE, DELAYED RELEASE ORAL at 08:07

## 2021-03-20 RX ADMIN — HYDROMORPHONE HYDROCHLORIDE 4 MILLIGRAM(S): 2 INJECTION INTRAMUSCULAR; INTRAVENOUS; SUBCUTANEOUS at 00:40

## 2021-03-20 NOTE — DISCHARGE NOTE NURSING/CASE MANAGEMENT/SOCIAL WORK - PATIENT PORTAL LINK FT
You can access the FollowMyHealth Patient Portal offered by [facility] by registering at the following website: [email or web address]. By joining [facility]’s FollowMyHealth portal, you will also be able to view your health information using other applications (apps) compatible with our system.

## 2021-06-23 PROCEDURE — 80048 BASIC METABOLIC PNL TOTAL CA: CPT

## 2021-06-23 PROCEDURE — 97163 PT EVAL HIGH COMPLEX 45 MIN: CPT

## 2021-06-23 PROCEDURE — C9399: CPT

## 2021-06-23 PROCEDURE — 76000 FLUOROSCOPY <1 HR PHYS/QHP: CPT

## 2021-06-23 PROCEDURE — 36415 COLL VENOUS BLD VENIPUNCTURE: CPT

## 2021-06-23 PROCEDURE — 85025 COMPLETE CBC W/AUTO DIFF WBC: CPT

## 2021-06-23 PROCEDURE — 86891 AUTOLOGOUS BLOOD OP SALVAGE: CPT

## 2021-06-23 PROCEDURE — C1889: CPT

## 2021-06-23 PROCEDURE — C1713: CPT

## 2022-04-13 ENCOUNTER — FORM ENCOUNTER (OUTPATIENT)
Age: 57
End: 2022-04-13

## 2022-04-14 ENCOUNTER — FORM ENCOUNTER (OUTPATIENT)
Age: 57
End: 2022-04-14

## 2022-04-28 ENCOUNTER — FORM ENCOUNTER (OUTPATIENT)
Age: 57
End: 2022-04-28

## 2022-05-09 ENCOUNTER — APPOINTMENT (OUTPATIENT)
Dept: ORTHOPEDIC SURGERY | Facility: CLINIC | Age: 57
End: 2022-05-09
Payer: COMMERCIAL

## 2022-05-09 VITALS — HEIGHT: 65 IN | BODY MASS INDEX: 25.83 KG/M2 | WEIGHT: 155 LBS

## 2022-05-09 DIAGNOSIS — Z78.9 OTHER SPECIFIED HEALTH STATUS: ICD-10-CM

## 2022-05-09 PROCEDURE — 99214 OFFICE O/P EST MOD 30 MIN: CPT

## 2022-05-09 NOTE — PHYSICAL EXAM
[de-identified] : Constitutional: The general appearance of the patient is well developed, well nourished, no deformities and well groomed. Normal \par \par Gait: Gait and function is as follows: normal gait. \par \par Skin: Head and neck visualized skin is normal. Left upper extremity visualized skin is normal. Right upper extremity visualized skin is normal. Thoracic Skin of the thoracic spine shows visualized skin is normal. \par \par Cardiovascular: palpable radial pulse bilaterally, good capillary refill in digits of the bilateral upper extremities and no temperature or color changes in the bilateral upper extremities. \par \par Lymphatic: Normal Palpation of lymph nodes in the cervical. \par \par Neurologic: fine motor control in the bilateral upper extremities is intact. Deep Tendon Reflexes in Upper and Lower Extremities Negative Bledsoe's in the bilateral upper extremities. The patient is oriented to time, place and person. Sensation to light touch intact in the bilateral upper extremities. Mood and Affect is normal. \par \par Right Shoulder: Inspection of the shoulder/upper arm is as follows: no scapula winging, no biceps deformity and no AC joint deformity. Palpation of the shoulder/upper arm is as follows: There is tenderness at the proximal biceps tendon. Range of motion of the shoulder is as follows: Pain with internal rotation, external rotation, abduction and forward flexion. Strength of the shoulder is as follows: Supraspinatus 4/5. External Rotation 4/5. Internal Rotation 4/5. Deltoid 5/5 Ligament Stability and Special Tests of the shoulder is as follows: Neer test is positive. Ovalle' test is positive. Speed's test is positive. \par \par Left Shoulder: Inspection of the shoulder/upper arm is as follows: There is a full, pain-free, stable range of motion of the shoulder with normal strength and no tenderness to palpation. \par \par Neck: \par Inspection / Palpation of the cervical spine is as follows: no paracervical tenderness. Range of motion of the cervical spine is as follows: moderately decreased range of motion of the cervical spine. Stability testing for the cervical spine is as follows Stable range of motion. \par \par \par Back, including spine: Inspection / Palpation of the thoracic/lumbar spine is as follows: There is a full, pain free, stable range of motion of the thoracic spine with a normal tone and not tenderness to palpation..\par

## 2022-05-09 NOTE — HISTORY OF PRESENT ILLNESS
[de-identified] : 58 yo female presenting with right shoulder pain x6 months. Denies any specific injury or trauma. Her pain is in the anterior and lateral aspect of her shoulder. States her pain has been getting progressively worse. Pain is worse with movement overhead. She presents with Stand up MRI of her right shoulder. She is referred by Dr. Gonzalez.\par 5/9/22: Patient returns today for repeat evaluation of right shoulder pain. She continues to report significant pain and discomfort. Patient has been completing HEP. ROM preserved.

## 2022-05-09 NOTE — DISCUSSION/SUMMARY
[de-identified] : 56 yo female presenting with right shoulder pain x 6 months. Denies any specific injury or trauma\par Stand Up MRI demonstrates full thickness insertional tear 1.2 cm \par Discussed with the patient ultimately she is a candidate for arthroscopic rotator cuff repair and mini open biceps tenodesis if she continues to have significant pain\par Patient will continue with conservative treatment at this point. \par Follow up in 2-3 months. If pain persists we would then discuss surgery. \par \par (1) We discussed a comprehensive treatment plans that included a prescription management plan involving the use of prescription strength medications to include Ibuprofen 600-800 mg TID, versus 500-650 mg Tylenol. We also discussed prescribing topical diclofenac (Voltaren gel) as well as once daily Meloxicam 15 mg.\par \par (2) The patient has More Than One chronic injuries/illnesses as outlined, discussed, and documented by ICD 10 codes listed, as well as the HPI and Plan section.\par There is a moderate risk of morbidity with further treatment, especially from use of prescription strength medications and possible side effects of these medications which include upset stomach and cardiac/renal issues with long term use were discussed.\par \par (3) I recommended that the patient follow-up with their medical physician to discuss any significant specific potential issues with long term use such as interactions with current medications or with exacerbation of underlying medical morbidities.\par

## 2022-05-25 ENCOUNTER — APPOINTMENT (OUTPATIENT)
Dept: ORTHOPEDIC SURGERY | Facility: CLINIC | Age: 57
End: 2022-05-25

## 2022-06-03 ENCOUNTER — APPOINTMENT (OUTPATIENT)
Dept: ORTHOPEDIC SURGERY | Facility: CLINIC | Age: 57
End: 2022-06-03
Payer: COMMERCIAL

## 2022-06-03 VITALS — HEIGHT: 65 IN | WEIGHT: 155 LBS | BODY MASS INDEX: 25.83 KG/M2

## 2022-06-03 PROCEDURE — 99072 ADDL SUPL MATRL&STAF TM PHE: CPT

## 2022-06-03 PROCEDURE — 99214 OFFICE O/P EST MOD 30 MIN: CPT

## 2022-06-03 PROCEDURE — 72100 X-RAY EXAM L-S SPINE 2/3 VWS: CPT

## 2022-06-03 NOTE — DISCUSSION/SUMMARY
[de-identified] : Discussed medical mgmt, renewal scripts for hydrocodone and Lyrica with discussion about continued weaning over time. Continue home exercise rehabilitation. Follow up 2 months.

## 2022-06-03 NOTE — HISTORY OF PRESENT ILLNESS
[de-identified] : Patient seen in follow up. Reports persistent low back stabbing pain, intermittent radicular pain. Pain improved with hydrocodone and Lyrica. Patient slowly weaning off of medications. Continues home exercises. Working full time.

## 2022-06-03 NOTE — PHYSICAL EXAM
[Normal Coordination] : normal coordination [Normal DTR UE/LE] : normal DTR UE/LE  [Normal Sensation] : normal sensation [Normal Mood and Affect] : normal mood and affect [Orientated] : orientated [Able to Communicate] : able to communicate [Normal Skin] : normal skin [No Rash] : no rash [No Ulcers] : no ulcers [No Lesions] : no lesions [No obvious lymphadenopathy in areas examined] : no obvious lymphadenopathy in areas examined [Well Developed] : well developed [Well Nourished] : well nourished [Peripheral vascular exam is grossly normal] : peripheral vascular exam is grossly normal [No Respiratory Distress] : no respiratory distress [Flexion] : flexion [Extension] : extension [] : negative sitting straight leg raise

## 2022-06-03 NOTE — DATA REVIEWED
[Lumbar Spine] : lumbar spine [I independently reviewed and interpreted images and report] : I independently reviewed and interpreted images and report [I reviewed the films/CD and additionally noted] : I reviewed the films/CD and additionally noted [FreeTextEntry1] : I stop paperwork reviewed

## 2022-07-01 ENCOUNTER — APPOINTMENT (OUTPATIENT)
Dept: ORTHOPEDIC SURGERY | Facility: CLINIC | Age: 57
End: 2022-07-01

## 2022-07-05 ENCOUNTER — FORM ENCOUNTER (OUTPATIENT)
Age: 57
End: 2022-07-05

## 2022-07-22 ENCOUNTER — APPOINTMENT (OUTPATIENT)
Dept: ORTHOPEDIC SURGERY | Facility: CLINIC | Age: 57
End: 2022-07-22

## 2022-07-22 VITALS — HEIGHT: 65 IN | WEIGHT: 155 LBS | BODY MASS INDEX: 25.83 KG/M2

## 2022-07-22 PROCEDURE — 99214 OFFICE O/P EST MOD 30 MIN: CPT

## 2022-07-22 NOTE — HISTORY OF PRESENT ILLNESS
[de-identified] : 56 yo female presenting with right shoulder pain x6 months. Denies any specific injury or trauma. Her pain is in the anterior and lateral aspect of her shoulder. States her pain has been getting progressively worse. Pain is worse with movement overhead. She presents with Stand up MRI of her right shoulder. She is referred by Dr. Gonzalez.\par 5/9/22: Patient returns today for repeat evaluation of right shoulder pain. She continues to report significant pain and discomfort. Patient has been completing HEP. ROM preserved. \par 7/22/22: Patient presents today for follow up of right shoulder pain. She states pain has been getting progressively worse. She has noticed worsening difficulty with overhead movement and activities of daily living. She wishes to discuss further options.

## 2022-07-22 NOTE — PHYSICAL EXAM
[de-identified] : Constitutional: The general appearance of the patient is well developed, well nourished, no deformities and well groomed. Normal \par \par Gait: Gait and function is as follows: normal gait. \par \par Skin: Head and neck visualized skin is normal. Left upper extremity visualized skin is normal. Right upper extremity visualized skin is normal. Thoracic Skin of the thoracic spine shows visualized skin is normal. \par \par Cardiovascular: palpable radial pulse bilaterally, good capillary refill in digits of the bilateral upper extremities and no temperature or color changes in the bilateral upper extremities. \par \par Lymphatic: Normal Palpation of lymph nodes in the cervical. \par \par Neurologic: fine motor control in the bilateral upper extremities is intact. Deep Tendon Reflexes in Upper and Lower Extremities Negative Bledsoe's in the bilateral upper extremities. The patient is oriented to time, place and person. Sensation to light touch intact in the bilateral upper extremities. Mood and Affect is normal. \par \par Right Shoulder: Inspection of the shoulder/upper arm is as follows: no scapula winging, no biceps deformity and no AC joint deformity. Palpation of the shoulder/upper arm is as follows: There is tenderness at the proximal biceps tendon. Range of motion of the shoulder is as follows: Pain with internal rotation, external rotation, abduction and forward flexion. Strength of the shoulder is as follows: Supraspinatus 4/5. External Rotation 4/5. Internal Rotation 4/5. Deltoid 5/5 Ligament Stability and Special Tests of the shoulder is as follows: Neer test is positive. Ovalle' test is positive. Speed's test is positive. \par \par Left Shoulder: Inspection of the shoulder/upper arm is as follows: There is a full, pain-free, stable range of motion of the shoulder with normal strength and no tenderness to palpation. \par \par Neck: \par Inspection / Palpation of the cervical spine is as follows: no paracervical tenderness. Range of motion of the cervical spine is as follows: moderately decreased range of motion of the cervical spine. Stability testing for the cervical spine is as follows Stable range of motion. \par \par \par Back, including spine: Inspection / Palpation of the thoracic/lumbar spine is as follows: There is a full, pain free, stable range of motion of the thoracic spine with a normal tone and not tenderness to palpation..\par

## 2022-07-22 NOTE — DISCUSSION/SUMMARY
[de-identified] : 56 yo female presenting with right shoulder pain x 8 months. \par  MRI demonstrates full thickness insertional tear 1.2 cm \par Overall pain has been getting progressively worse despite all conservative treatment including rest, anti-inflammatory medication and physical therapy.\par \par Pt has a full thickness rotator cuff tear as injury to the biceps-labral complex and continues to report pain and weakness despite more than 3 months of conservative treatment including focused HEP/therapy, injections, anti-inflammatory medication and activity modification.\par The patient is interested in pursuing surgical treatment.\par We had a lengthy discussion about the nature of arthroscopic rotator cuff repair surgery including the likelihood of addressing other pathology with arthroscopic vs mini-open biceps tenodesis, subacromial decompression, and extensive debridement of any pathologic tissue encountered at the time of surgery.\par I discussed the risks and benefits of both operative and non-operative treatment. I explained in detail the postoperative recovery including the duration of sling use and expectation for postoperative physical therapy. Explained that there is no guarantee however there is a high likelihood of functional improvement and pain relief. The patient understood all this was discussed.\par \par The patient is indicated for a Right shoulder arthroscopic rotator cuff repair, biceps tenodesis and subacromial decompression.\par \par * Surgery: Considering patient has failed all conservative treatment we are requesting authorization for:\par - Right shoulder arthroscopic rotator cuff repair (CPT code 45356), mini open biceps biceps tenodesis (CPT 58706), debridement (CPT 46413) Subacromial decompression (30248)\par Pt would like surgery (September)\par The patient will require a [place] 2.0 brace, cryotherapy, and 12 weeks of post-operative physical therapy.\par The patient will require a medical clearance\par The doctor will require the [facility] representative, one hour, and one assistant.\par \par (1) We discussed a comprehensive treatment plans that included a prescription management plan involving the use of prescription strength medications to include Ibuprofen 600-800 mg TID, versus 500-650 mg Tylenol. We also discussed prescribing topical diclofenac (Voltaren gel) as well as once daily Meloxicam 15 mg.\par \par (2) The patient has More Than One chronic injuries/illnesses as outlined, discussed, and documented by ICD 10 codes listed, as well as the HPI and Plan section.\par There is a moderate risk of morbidity with further treatment, especially from use of prescription strength medications and possible side effects of these medications which include upset stomach and cardiac/renal issues with long term use were discussed.\par \par (3) I recommended that the patient follow-up with their medical physician to discuss any significant specific potential issues with long term use such as interactions with current medications or with exacerbation of underlying medical morbidities.\par \par \par \par

## 2022-08-03 ENCOUNTER — APPOINTMENT (OUTPATIENT)
Dept: ORTHOPEDIC SURGERY | Facility: CLINIC | Age: 57
End: 2022-08-03

## 2022-08-03 VITALS — WEIGHT: 155 LBS | HEIGHT: 65 IN | BODY MASS INDEX: 25.83 KG/M2

## 2022-08-03 PROCEDURE — 99214 OFFICE O/P EST MOD 30 MIN: CPT

## 2022-08-03 PROCEDURE — 99072 ADDL SUPL MATRL&STAF TM PHE: CPT

## 2022-08-03 PROCEDURE — 72100 X-RAY EXAM L-S SPINE 2/3 VWS: CPT

## 2022-08-14 NOTE — DATA REVIEWED
[Lumbar Spine] : lumbar spine [I independently reviewed and interpreted images and report] : I independently reviewed and interpreted images and report [I reviewed the films/CD and additionally noted] : I reviewed the films/CD and additionally noted [FreeTextEntry1] : I stop paperwork reviewed Clear

## 2022-08-14 NOTE — PHYSICAL EXAM
[Normal Coordination] : normal coordination [Normal DTR UE/LE] : normal DTR UE/LE  [Normal Sensation] : normal sensation [Normal Mood and Affect] : normal mood and affect [Orientated] : orientated [Able to Communicate] : able to communicate [Normal Skin] : normal skin [No Rash] : no rash [No Ulcers] : no ulcers [No Lesions] : no lesions [No obvious lymphadenopathy in areas examined] : no obvious lymphadenopathy in areas examined [Well Developed] : well developed [Well Nourished] : well nourished [Peripheral vascular exam is grossly normal] : peripheral vascular exam is grossly normal [No Respiratory Distress] : no respiratory distress [Flexion] : flexion [Extension] : extension [Implants in position] : Implants in position [No loosening of hardware] : No loosening of hardware [Fusion intact] : Fusion intact [] : negative sitting straight leg raise

## 2022-08-14 NOTE — DISCUSSION/SUMMARY
[Medication Risks Reviewed] : Medication risks reviewed [de-identified] : Discussed medical mgmt, renewal scripts for hydrocodone and Lyrica with discussion about continued weaning over time. Continue home exercise rehabilitation. Discussed and reviewed results of lumbar x-ray.  Follow up 2 months for repeat x-ray. \par \par Prior to appointment and during encounter with patient extensive medical records were reviewed including but not limited to, hospital records, outpatient records, imaging results, and lab data.During this appointment the patient was examined, diagnoses were discussed and explained in a face to face manner. In addition extensive time was spent reviewing aforementioned diagnostic studies. Counseling including abnormal image results, differential diagnoses, treatment options, risk and benefits, lifestyle changes, current condition, and current medications was performed. Patient's comments, questions, and concerns were addressed and patient verbalized understanding. Based on this patient's presentation at our office, which is an orthopedic spine surgeon's office, this patient inherently / intrinsically has a risk, however minute, of developing issues such as Cauda equina syndrome, bowel and bladder changes, or progression of motor or neurological deficits such as paralysis which may be permanent.\par \par VANESSA TENORIO Acting as a Scribe for Dr. Nelly WAITE, Vanessa Tenorio, attest that this documentation has been prepared under the direction and in the presence of Provider Bakari Gonzalez MD.

## 2022-08-14 NOTE — HISTORY OF PRESENT ILLNESS
[de-identified] : Patient seen in follow up. Reports persistent low back stabbing pain, intermittent radicular pain. Pain improved with hydrocodone and Lyrica. Patient slowly weaning off of medications. Continues home exercises. Working full time. Patient is pleased with progress from surgery. \par \par Shoulder Surgery with Dr. Vazquez 09/08/2022

## 2022-09-02 ENCOUNTER — APPOINTMENT (OUTPATIENT)
Dept: ORTHOPEDIC SURGERY | Facility: CLINIC | Age: 57
End: 2022-09-02

## 2022-09-02 VITALS — BODY MASS INDEX: 25.83 KG/M2 | WEIGHT: 155 LBS | HEIGHT: 65 IN

## 2022-09-02 PROCEDURE — 99214 OFFICE O/P EST MOD 30 MIN: CPT

## 2022-09-02 NOTE — HISTORY OF PRESENT ILLNESS
[de-identified] : 56 yo female presenting with right shoulder pain x6 months. Denies any specific injury or trauma. Her pain is in the anterior and lateral aspect of her shoulder. States her pain has been getting progressively worse. Pain is worse with movement overhead. She presents with Stand up MRI of her right shoulder. She is referred by Dr. Gonzalez.\par 5/9/22: Patient returns today for repeat evaluation of right shoulder pain. She continues to report significant pain and discomfort. Patient has been completing HEP. ROM preserved. \par 7/22/22: Patient presents today for follow up of right shoulder pain. She states pain has been getting progressively worse. She has noticed worsening difficulty with overhead movement and activities of daily living. She wishes to discuss further options.\par 9/2/22: Patient returns today for repeat evaluation of right shoulder pain. She has full thickness rotator cuff tear. pain has been getting progressively worse. patient wishes to discuss surgery.

## 2022-09-02 NOTE — PHYSICAL EXAM
[de-identified] : Constitutional: The general appearance of the patient is well developed, well nourished, no deformities and well groomed. Normal \par \par Gait: Gait and function is as follows: normal gait. \par \par Skin: Head and neck visualized skin is normal. Left upper extremity visualized skin is normal. Right upper extremity visualized skin is normal. Thoracic Skin of the thoracic spine shows visualized skin is normal. \par \par Cardiovascular: palpable radial pulse bilaterally, good capillary refill in digits of the bilateral upper extremities and no temperature or color changes in the bilateral upper extremities. \par \par Lymphatic: Normal Palpation of lymph nodes in the cervical. \par \par Neurologic: fine motor control in the bilateral upper extremities is intact. Deep Tendon Reflexes in Upper and Lower Extremities Negative Bledsoe's in the bilateral upper extremities. The patient is oriented to time, place and person. Sensation to light touch intact in the bilateral upper extremities. Mood and Affect is normal. \par \par Right Shoulder: Inspection of the shoulder/upper arm is as follows: no scapula winging, no biceps deformity and no AC joint deformity. Palpation of the shoulder/upper arm is as follows: There is tenderness at the proximal biceps tendon. Range of motion of the shoulder is as follows: Pain with internal rotation, external rotation, abduction and forward flexion. Strength of the shoulder is as follows: Supraspinatus 4/5. External Rotation 4/5. Internal Rotation 4/5. Deltoid 5/5 Ligament Stability and Special Tests of the shoulder is as follows: Neer test is positive. Ovalle' test is positive. Speed's test is positive. \par \par Left Shoulder: Inspection of the shoulder/upper arm is as follows: There is a full, pain-free, stable range of motion of the shoulder with normal strength and no tenderness to palpation. \par \par Neck: \par Inspection / Palpation of the cervical spine is as follows: no paracervical tenderness. Range of motion of the cervical spine is as follows: moderately decreased range of motion of the cervical spine. Stability testing for the cervical spine is as follows Stable range of motion. \par \par \par Back, including spine: Inspection / Palpation of the thoracic/lumbar spine is as follows: There is a full, pain free, stable range of motion of the thoracic spine with a normal tone and not tenderness to palpation..\par

## 2022-09-02 NOTE — DISCUSSION/SUMMARY
[de-identified] : 58 yo female presenting with right shoulder pain x 8 months. \par  MRI demonstrates full thickness insertional tear 1.2 cm \par Overall pain has been getting progressively worse despite all conservative treatment including rest, anti-inflammatory medication and physical therapy.\par \par The patient is indicated for a Right shoulder arthroscopic rotator cuff repair, biceps tenodesis and subacromial decompression.\par We had a long discussion about the risks and benefits of operative and non-operative treatment. We discussed the pertinent risks of surgery which include but are not limited to infection, pain, stiffness, arthrofibrosis, failure to alleviate symptoms, and injury to nerves or vessels.\par In addition, we discussed in great detail the postoperative protocol to include appropriate bracing and time of immobilization. Patient was fit for the [employer] Arc Brace in the office today. We discussed limitations in postoperative driving as well as the appropriate use of pain medication prescribed after surgery.\par The patient acknowledged all of the above and will proceed with the recommended surgery.\par Prescriptions for postoperative medications were provided. All final questions were answered to the patient’s satisfaction. The patient will follow up at his scheduled surgical time.\par \par As a post-operative protocol, I am prescribing an iceless cold/heat compression therapy device for at home use to be used 3-5 times per day at 40 degrees for 35 days as an alternative to pain medication. I would like my patient to begin with simultaneous cold & compression therapy at 10mmHg pressure. At the patients follow up I will determine whether they should continue with cold, or if they should transition to contrast cold/heat compression therapy. Unlike a conventional cold therapy unit that requires ice, the ThermX iceless device is set to a prescribed temperature that it will remain throughout the entire duration of use, whether that be cold compression, heat compression, or contrast compression. Cold therapy units that depend on ice melt over a very short period and do not provide compression which limits the compliance and effectiveness for pain/inflammation reduction that I am targeting for my patient. I have reached out to [facility] to supply this device as they are the exclusive provider of the ThermX and the patient will be contacted and instructed on how to utilize the device.\par \par \par \par (1) We discussed a comprehensive treatment plans that included a prescription management plan involving the use of prescription strength medications to include Ibuprofen 600-800 mg TID, versus 500-650 mg Tylenol. We also discussed prescribing topical diclofenac (Voltaren gel) as well as once daily Meloxicam 15 mg.\par \par (2) The patient has More Than One chronic injuries/illnesses as outlined, discussed, and documented by ICD 10 codes listed, as well as the HPI and Plan section.\par There is a moderate risk of morbidity with further treatment, especially from use of prescription strength medications and possible side effects of these medications which include upset stomach and cardiac/renal issues with long term use were discussed.\par \par (3) I recommended that the patient follow-up with their medical physician to discuss any significant specific potential issues with long term use such as interactions with current medications or with exacerbation of underlying medical morbidities.\par \par \par \par

## 2022-09-08 ENCOUNTER — RESULT REVIEW (OUTPATIENT)
Age: 57
End: 2022-09-08

## 2022-09-08 ENCOUNTER — APPOINTMENT (OUTPATIENT)
Dept: ORTHOPEDIC SURGERY | Facility: HOSPITAL | Age: 57
End: 2022-09-08

## 2022-09-08 PROCEDURE — 29827 SHO ARTHRS SRG RT8TR CUF RPR: CPT | Mod: RT

## 2022-09-08 PROCEDURE — 29826 SHO ARTHRS SRG DECOMPRESSION: CPT | Mod: AS,RT

## 2022-09-08 PROCEDURE — 29827 SHO ARTHRS SRG RT8TR CUF RPR: CPT | Mod: AS,RT

## 2022-09-08 PROCEDURE — 29821 SHO ARTHRS SRG COMPL SYNVCT: CPT | Mod: 59,RT

## 2022-09-08 PROCEDURE — 29823 SHO ARTHRS SRG XTNSV DBRDMT: CPT | Mod: AS,59,RT

## 2022-09-08 PROCEDURE — 29821 SHO ARTHRS SRG COMPL SYNVCT: CPT | Mod: AS,59,RT

## 2022-09-08 PROCEDURE — 29826 SHO ARTHRS SRG DECOMPRESSION: CPT | Mod: RT

## 2022-09-08 PROCEDURE — 23430 REPAIR BICEPS TENDON: CPT | Mod: AS,59,RT

## 2022-09-08 PROCEDURE — 29823 SHO ARTHRS SRG XTNSV DBRDMT: CPT | Mod: 59,RT

## 2022-09-08 PROCEDURE — 23420 REPAIR OF SHOULDER: CPT | Mod: 59,RT

## 2022-09-13 RX ORDER — HYDROCODONE BITARTRATE AND ACETAMINOPHEN 10; 325 MG/1; MG/1
10-325 TABLET ORAL
Qty: 30 | Refills: 0 | Status: ACTIVE | COMMUNITY
Start: 2022-09-13 | End: 1900-01-01

## 2022-09-16 ENCOUNTER — APPOINTMENT (OUTPATIENT)
Dept: ORTHOPEDIC SURGERY | Facility: CLINIC | Age: 57
End: 2022-09-16

## 2022-09-16 VITALS — WEIGHT: 155 LBS | BODY MASS INDEX: 25.83 KG/M2 | HEIGHT: 65 IN

## 2022-09-16 DIAGNOSIS — M75.121 COMPLETE ROTATOR CUFF TEAR OR RUPTURE OF RIGHT SHOULDER, NOT SPECIFIED AS TRAUMATIC: ICD-10-CM

## 2022-09-16 PROCEDURE — 99024 POSTOP FOLLOW-UP VISIT: CPT

## 2022-09-16 NOTE — HISTORY OF PRESENT ILLNESS
[de-identified] : 58 yo female presenting with right shoulder pain x6 months. Denies any specific injury or trauma. Her pain is in the anterior and lateral aspect of her shoulder. States her pain has been getting progressively worse. Pain is worse with movement overhead. She presents with Stand up MRI of her right shoulder. She is referred by Dr. Gonzalez.\par 5/9/22: Patient returns today for repeat evaluation of right shoulder pain. She continues to report significant pain and discomfort. Patient has been completing HEP. ROM preserved. \par 7/22/22: Patient presents today for follow up of right shoulder pain. She states pain has been getting progressively worse. She has noticed worsening difficulty with overhead movement and activities of daily living. She wishes to discuss further options.\par 9/2/22: Patient returns today for repeat evaluation of right shoulder pain. She has full thickness rotator cuff tear. pain has been getting progressively worse. patient wishes to discuss surgery.\par 9/16/22: Patient presents 8 days s/p right arthroscopic rotator cuff repair (1 medial, 1 lateral with DOD), SAD and biceps tenodesis. Patient is doing well, pain is controlled. \par Patient has been compliant with the brace. Denies any fever, chills, drainage.\par

## 2022-09-16 NOTE — PHYSICAL EXAM
[de-identified] : Constitutional: The general appearance of the patient is well developed, well nourished, no deformities and well groomed. Normal \par \par Gait: Gait and function is as follows: normal gait. \par \par Skin: Head and neck visualized skin is normal. Left upper extremity visualized skin is normal. Right upper extremity visualized skin is normal. Thoracic Skin of the thoracic spine shows visualized skin is normal. \par \par Cardiovascular: palpable radial pulse bilaterally, good capillary refill in digits of the bilateral upper extremities and no temperature or color changes in the bilateral upper extremities. \par \par Lymphatic: Normal Palpation of lymph nodes in the cervical. \par \par Neurologic: fine motor control in the bilateral upper extremities is intact. Deep Tendon Reflexes in Upper and Lower Extremities Negative Bledsoe's in the bilateral upper extremities. The patient is oriented to time, place and person. Sensation to light touch intact in the bilateral upper extremities. Mood and Affect is normal. \par \par Right Shoulder: Inspection of the shoulder/upper arm is as follows: no scapula winging, no biceps deformity and no AC joint deformity. Palpation of the shoulder/upper arm is as follows: There is tenderness at the proximal biceps tendon. Range of motion of the shoulder is as follows: Pain with internal rotation, external rotation, abduction and forward flexion. Strength of the shoulder is as follows: Supraspinatus 4/5. External Rotation 4/5. Internal Rotation 4/5. Deltoid 5/5 Ligament Stability and Special Tests of the shoulder is as follows: Neer test is positive. Ovalle' test is positive. Speed's test is positive. \par \par Left Shoulder: Inspection of the shoulder/upper arm is as follows: There is a full, pain-free, stable range of motion of the shoulder with normal strength and no tenderness to palpation. \par \par Neck: \par Inspection / Palpation of the cervical spine is as follows: no paracervical tenderness. Range of motion of the cervical spine is as follows: moderately decreased range of motion of the cervical spine. Stability testing for the cervical spine is as follows Stable range of motion. \par \par \par Back, including spine: Inspection / Palpation of the thoracic/lumbar spine is as follows: There is a full, pain free, stable range of motion of the thoracic spine with a normal tone and not tenderness to palpation..\par

## 2022-09-16 NOTE — DISCUSSION/SUMMARY
[de-identified] : This is a 56 yo female 8 days s/p right arthroscopic rotator cuff repair (1 medial, 1 lateral with DOD), subacromial decompression and mini open biceps tenodesis. \par patient is doing well, pain is controlled.  \par sutures removed today in the office. All incisions are healing well with no evidence of infection. \par Patient was provided PT prescription according to small protocol. They will start in 2 weeks. \par Continue with sling immobilizer \par Continue elbow,wrist,hand motion. \par Patient will follow up in 1 month.\par

## 2022-09-21 NOTE — PHYSICAL THERAPY INITIAL EVALUATION ADULT - REFERRAL TO ANOTHER SERVICE NEEDED, PT EVAL
[facility] EMERGENCY DEPARTMENT      CHIEF COMPLAINT  URI (Multiple complaints: cough, wheezing, congestion, ear pain, possible UTI. \"On an antibiotic\")     HISTORY OF PRESENT ILLNESS  Marichuy Yousif is a 26 y.o. female  who presents to the ED complaining of multiple complaints. Patient states that she has a history of asthma, has been having a cough and some wheezing for the past day. She denies any associated fevers. She used her inhaler 1 time however states that it was  has not take anything else for symptoms. She also states that she is having some right ear pain. States that she has a history of recurrent ear infections however has never followed up with ENT. She states that she is currently on an antibiotic, states that she is taking drops as well as an oral antibiotic but she is unsure what she is taking. She denies other complaints or concerns. No other complaints, modifying factors or associated symptoms. I have reviewed the following from the nursing documentation. Past Medical History:   Diagnosis Date    ADD (attention deficit disorder)     ADHD     Anxiety     Asthma     Bipolar 1 disorder (HCC)     Depression     GERD (gastroesophageal reflux disease)     Hypertension     IBS (irritable bowel syndrome)     Mood swings     Personality disorder (HCC)      Past Surgical History:   Procedure Laterality Date    CHOLECYSTECTOMY      ENDOMETRIAL ABLATION      HERNIA REPAIR      umbilical    HYSTERECTOMY, TOTAL ABDOMINAL (CERVIX REMOVED)  2019    SINUS SURGERY      TONSILLECTOMY      TYMPANOSTOMY TUBE PLACEMENT      WISDOM TOOTH EXTRACTION       History reviewed. No pertinent family history.   Social History     Socioeconomic History    Marital status:      Spouse name: Not on file    Number of children: Not on file    Years of education: Not on file    Highest education level: Not on file   Occupational History    Not on file   Tobacco Use    Smoking status: Former     Types: Cigarettes     Quit date: 2014     Years since quittin.0    Smokeless tobacco: Never   Vaping Use    Vaping Use: Never used   Substance and Sexual Activity    Alcohol use: Yes     Comment: once a week    Drug use: Never    Sexual activity: Yes     Partners: Female   Other Topics Concern    Not on file   Social History Narrative    Not on file     Social Determinants of Health     Financial Resource Strain: Not on file   Food Insecurity: Not on file   Transportation Needs: Not on file   Physical Activity: Not on file   Stress: Not on file   Social Connections: Not on file   Intimate Partner Violence: Not on file   Housing Stability: Not on file     No current facility-administered medications for this encounter. Current Outpatient Medications   Medication Sig Dispense Refill    amoxicillin-clavulanate (AUGMENTIN) 875-125 MG per tablet Take 1 tablet by mouth 2 times daily for 7 days 14 tablet 0    albuterol sulfate HFA (PROVENTIL;VENTOLIN;PROAIR) 108 (90 Base) MCG/ACT inhaler Inhale 2 puffs into the lungs every 6 hours as needed for Wheezing 18 g 0    Dextromethorphan-Pyrilamine (CAPRON DMT) 30-30 MG TABS Take 1 tablet by mouth every 8 hours as needed (cough) 9 tablet 0    predniSONE (DELTASONE) 50 MG tablet Take 1 tablet by mouth daily for 5 days 5 tablet 0    propranolol (INDERAL) 10 MG tablet Take 10 mg by mouth 3 times daily      pantoprazole (PROTONIX) 20 MG tablet Take 20 mg by mouth 2 times daily      dicyclomine (BENTYL) 10 MG capsule Take 10 mg by mouth 2 times daily       Allergies   Allergen Reactions    Buspar [Buspirone]     Hepatitis B Virus Vaccines     Lisdexamfetamine Dimesylate      \"Shaking, lose my appetite and get nauseous. \"    Morphine      Other reaction(s): Headache  \"Doesn't help me, makes me irritable and gives me a bad headache. \"    Vyvanse [Lisdexamfetamine]        REVIEW OF SYSTEMS  10 systems reviewed, pertinent positives per HPI otherwise noted to be negative.     PHYSICAL EXAM  /78   Pulse 90   Temp 98.9 °F (37.2 °C) (Oral)   Resp 16   Ht 5' 3\" (1.6 m)   Wt 299 lb (135.6 kg)   SpO2 98%   BMI 52.97 kg/m²    GENERAL APPEARANCE: Awake and alert. Cooperative. No acute distress. HENT: Normocephalic. Atraumatic. Mucous membranes are moist.  Left TM is clear, right TM is erythematous. EACs clear bilaterally without evidence of infection. No mastoid tenderness or swelling. NECK: Supple. EYES: PERRL. EOM's grossly intact. HEART/CHEST: RRR. No murmurs. LUNGS: Respirations unlabored. Mild end expiratory wheeze bilaterally throughout lung fields. Speaking comfortably in full sentences. ABDOMEN: No tenderness. Soft. Non-distended. No masses. No organomegaly. No guarding or rebound. MUSCULOSKELETAL: No extremity edema. Compartments soft. No deformity. No tenderness in the extremities. All extremities neurovascularly intact. SKIN: Warm and dry. No acute rashes. NEUROLOGICAL: Alert and oriented. CN's 2-12 intact. No gross facial drooping. Strength 5/5, sensation intact. Gait normal.  PSYCHIATRIC: Normal mood and affect. LABS  I have reviewed all labs for this visit. No results found for this visit on 09/21/22. RADIOLOGY  No orders to display     ED COURSE/MDM  Patient seen and evaluated. Old records reviewed. Labs and imaging reviewed and results discussed with patient. Patient is a 13-year-old female, presenting with concerns for cough and wheezing, right ear pain with history of recurrent ear infections. Full HPI as detailed above. Upon arrival in the ED, vitals reassuring. Patient is resting comfortably and is in no acute distress. Her right TM is erythematous. Ears otherwise appear well. She does have some mild expiratory wheezing as well consistent with asthma exacerbation.   She will be treated with steroids and breathing treatment here in the department, and will be sent with prescription for steroids and a new prescription for inhaler at home and she states that hers is . She is unsure what antibiotic she is taking but is requesting to be changed to Augmentin for her ear infection. I did stressed the need for close follow-up with ENT given her recurrent ear infections that she has not followed up with them yet. She will be changed to Augmentin. Given return precautions to the ED. She is comfortable agreement with plan of care and was discharged. I, Dr. Dodie Rizo MD, am the primary clinician of record. Is this patient to be included in the SEP-1 Core Measure? No   Exclusion criteria - the patient is NOT to be included for SEP-1 Core Measure due to:  2+ SIRS criteria are not met     During the patient's ED course, the patient was given:  Medications   ipratropium-albuterol (DUONEB) nebulizer solution 1 ampule (1 ampule Inhalation Given 22)   predniSONE (DELTASONE) tablet 40 mg (40 mg Oral Given 22)        CLINICAL IMPRESSION  1. Recurrent acute suppurative otitis media of right ear without spontaneous rupture of tympanic membrane    2. Mild asthma with exacerbation, unspecified whether persistent        Blood pressure 126/78, pulse 90, temperature 98.9 °F (37.2 °C), temperature source Oral, resp. rate 16, height 5' 3\" (1.6 m), weight 299 lb (135.6 kg), SpO2 98 %, not currently breastfeeding. Ld Yoder was discharged to home in good condition. Patient was given scripts for the following medications. I counseled patient how to take these medications.    New Prescriptions    AMOXICILLIN-CLAVULANATE (AUGMENTIN) 875-125 MG PER TABLET    Take 1 tablet by mouth 2 times daily for 7 days    DEXTROMETHORPHAN-PYRILAMINE (CAPRON DMT) 30-30 MG TABS    Take 1 tablet by mouth every 8 hours as needed (cough)    PREDNISONE (DELTASONE) 50 MG TABLET    Take 1 tablet by mouth daily for 5 days       Follow-up with:  [facility]  [id number] [id number]    Schedule an appointment as soon as possible for a visit       [address] [place] [address] [address] [phone]  [phone]  Schedule an appointment as soon as possible for a visit       DISCLAIMER: This chart was created using Dragon dictation software. Efforts were made by me to ensure accuracy, however some errors may be present due to limitations of this technology and occasionally words are not transcribed correctly.        Lance Joyce MD  09/21/22 [id number] occupational therapy

## 2022-10-04 ENCOUNTER — FORM ENCOUNTER (OUTPATIENT)
Age: 57
End: 2022-10-04

## 2022-10-05 RX ORDER — HYDROCODONE BITARTRATE AND ACETAMINOPHEN 5; 300 MG/1; MG/1
5-300 TABLET ORAL EVERY 8 HOURS
Qty: 90 | Refills: 0 | Status: DISCONTINUED | COMMUNITY
Start: 2022-07-06 | End: 2022-10-05

## 2022-10-05 RX ORDER — HYDROCODONE BITARTRATE AND ACETAMINOPHEN 5; 300 MG/1; MG/1
5-300 TABLET ORAL 3 TIMES DAILY
Qty: 45 | Refills: 0 | Status: DISCONTINUED | COMMUNITY
Start: 2022-04-29 | End: 2022-10-05

## 2022-10-05 RX ORDER — HYDROCODONE BITARTRATE AND ACETAMINOPHEN 5; 300 MG/1; MG/1
5-300 TABLET ORAL EVERY 8 HOURS
Qty: 90 | Refills: 0 | Status: DISCONTINUED | COMMUNITY
Start: 2022-04-20 | End: 2022-10-05

## 2022-10-05 RX ORDER — HYDROCODONE BITARTRATE AND ACETAMINOPHEN 5; 300 MG/1; MG/1
5-300 TABLET ORAL EVERY 8 HOURS
Qty: 90 | Refills: 0 | Status: DISCONTINUED | COMMUNITY
Start: 2022-06-03 | End: 2022-10-05

## 2022-10-07 ENCOUNTER — APPOINTMENT (OUTPATIENT)
Dept: ORTHOPEDIC SURGERY | Facility: CLINIC | Age: 57
End: 2022-10-07

## 2022-10-07 VITALS — BODY MASS INDEX: 25.83 KG/M2 | WEIGHT: 155 LBS | HEIGHT: 65 IN

## 2022-10-07 PROCEDURE — 99072 ADDL SUPL MATRL&STAF TM PHE: CPT

## 2022-10-07 PROCEDURE — 99214 OFFICE O/P EST MOD 30 MIN: CPT | Mod: 24

## 2022-10-07 PROCEDURE — 72100 X-RAY EXAM L-S SPINE 2/3 VWS: CPT

## 2022-10-09 NOTE — PHYSICAL EXAM
[Normal Coordination] : normal coordination [Normal DTR UE/LE] : normal DTR UE/LE  [Normal Sensation] : normal sensation [Normal Mood and Affect] : normal mood and affect [Orientated] : orientated [Able to Communicate] : able to communicate [Normal Skin] : normal skin [No Rash] : no rash [No Ulcers] : no ulcers [No Lesions] : no lesions [No obvious lymphadenopathy in areas examined] : no obvious lymphadenopathy in areas examined [Well Developed] : well developed [Peripheral vascular exam is grossly normal] : peripheral vascular exam is grossly normal [No Respiratory Distress] : no respiratory distress [] : non-antalgic

## 2022-10-09 NOTE — IMAGING
[No loosening of hardware] : No loosening of hardware [FreeTextEntry1] : progress towards fusion noted

## 2022-10-09 NOTE — HISTORY OF PRESENT ILLNESS
[de-identified] : Patient returns to the office for a follow up regarding lumbar spine. Symptoms essentially remain on changed since last visit.  Pain is in the central part of our lower back with some intimate symptoms into the leg. She continues to use lyrica. She remains satisfied with progress from surgery.\par \par Continues home exercises. Working full time. Patient is pleased with progress from surgery. \par \par Shoulder Surgery with Dr. Vazquez 09/08/2022

## 2022-10-09 NOTE — DISCUSSION/SUMMARY
[de-identified] : She will continue with home exercise and medications. Lyrica renewed. She will try and wean down from medications.

## 2022-10-11 ENCOUNTER — FORM ENCOUNTER (OUTPATIENT)
Age: 57
End: 2022-10-11

## 2022-10-14 ENCOUNTER — APPOINTMENT (OUTPATIENT)
Dept: ORTHOPEDIC SURGERY | Facility: CLINIC | Age: 57
End: 2022-10-14

## 2022-10-14 VITALS — WEIGHT: 155 LBS | BODY MASS INDEX: 25.83 KG/M2 | HEIGHT: 65 IN

## 2022-10-14 PROCEDURE — 99024 POSTOP FOLLOW-UP VISIT: CPT

## 2022-10-14 NOTE — PHYSICAL EXAM
[de-identified] : Constitutional: The general appearance of the patient is well developed, well nourished, no deformities and well groomed. Normal \par \par Gait: Gait and function is as follows: normal gait. \par \par Skin: Head and neck visualized skin is normal. Left upper extremity visualized skin is normal. Right upper extremity visualized skin is normal. Thoracic Skin of the thoracic spine shows visualized skin is normal. \par \par Cardiovascular: palpable radial pulse bilaterally, good capillary refill in digits of the bilateral upper extremities and no temperature or color changes in the bilateral upper extremities. \par \par Lymphatic: Normal Palpation of lymph nodes in the cervical. \par \par Neurologic: fine motor control in the bilateral upper extremities is intact. Deep Tendon Reflexes in Upper and Lower Extremities Negative Bledsoe's in the bilateral upper extremities. The patient is oriented to time, place and person. Sensation to light touch intact in the bilateral upper extremities. Mood and Affect is normal. \par \par Right Shoulder: Inspection of the shoulder/upper arm is as follows: no scapula winging, no biceps deformity and no AC joint deformity. Palpation of the shoulder/upper arm is as follows: There is tenderness at the proximal biceps tendon. Range of motion of the shoulder is as follows: Pain with internal rotation, external rotation, abduction and forward flexion. Strength of the shoulder is as follows: Supraspinatus 4/5. External Rotation 4/5. Internal Rotation 4/5. Deltoid 5/5 Ligament Stability and Special Tests of the shoulder is as follows: Neer test is positive. Ovalle' test is positive. Speed's test is positive. \par \par Left Shoulder: Inspection of the shoulder/upper arm is as follows: There is a full, pain-free, stable range of motion of the shoulder with normal strength and no tenderness to palpation. \par \par Neck: \par Inspection / Palpation of the cervical spine is as follows: no paracervical tenderness. Range of motion of the cervical spine is as follows: moderately decreased range of motion of the cervical spine. Stability testing for the cervical spine is as follows Stable range of motion. \par \par \par Back, including spine: Inspection / Palpation of the thoracic/lumbar spine is as follows: There is a full, pain free, stable range of motion of the thoracic spine with a normal tone and not tenderness to palpation..\par

## 2022-10-14 NOTE — DISCUSSION/SUMMARY
[de-identified] : This is a 56 yo female 5 weeks s/p right arthroscopic rotator cuff repair (1 medial, 1 lateral with DOD), subacromial decompression and mini open biceps tenodesis. \par patient is doing well, pain is controlled.  \par All incisions are healing well with no evidence of infection. \par Patient was provided PT prescription according to small protocol. She will continue x2 weekly\par Demonstrated pendulums and supine FF for HEP\par Patient will follow up in 6 weeks.\par \par \par \par (1) We discussed a comprehensive treatment plans that included a prescription management plan involving the use of prescription strength medications to include Ibuprofen 600-800 mg TID, versus 500-650 mg Tylenol. We also discussed prescribing topical diclofenac (Voltaren gel) as well as once daily Meloxicam 15 mg.\par (2) The patient has More Than One chronic injuries/illnesses as outlined, discussed, and documented by ICD 10 codes listed, as well as the HPI and Plan section.\par There is a moderate risk of morbidity with further treatment, especially from use of prescription strength medications and possible side effects of these medications which include upset stomach and cardiac/renal issues with long term use were discussed.\par (3) I recommended that the patient follow-up with their medical physician to discuss any significant specific potential issues with long term use such as interactions with current medications or with exacerbation of underlying medical morbidities. \par \par Attestation:\par I, Radha rBink , attest that this documentation has been prepared under the direction and in the presence of Provider Ajay Vazquez MD.\par The documentation recorded by the scribe, in my presence, accurately reflects the service I personally performed, and the decisions made by me with my edits as appropriate.\par Ajay Vazquez MD\par \par \par

## 2022-10-17 ENCOUNTER — FORM ENCOUNTER (OUTPATIENT)
Age: 57
End: 2022-10-17

## 2022-11-04 ENCOUNTER — APPOINTMENT (OUTPATIENT)
Dept: ORTHOPEDIC SURGERY | Facility: CLINIC | Age: 57
End: 2022-11-04

## 2022-11-04 PROCEDURE — 99024 POSTOP FOLLOW-UP VISIT: CPT

## 2022-11-04 NOTE — DISCUSSION/SUMMARY
[de-identified] : This is a 58 yo female 5 weeks s/p right arthroscopic rotator cuff repair (1 medial, 1 lateral with DOD), subacromial decompression and mini open biceps tenodesis. \par patient is doing well, pain is controlled.  \par All incisions are healing well with no evidence of infection. \par Patient was provided PT prescription according to small protocol. She will continue x2 weekly\par Demonstrated pendulums and supine FF for HEP\par Advised patient to begin weaning off of pain medications. \par Discontinue use of sling. \par If pain persists or worsens consider corticosteroid injection.\par Patient will follow up in 6 weeks.\par CSI if still severe pain\par \par (1) We discussed a comprehensive treatment plans that included a prescription management plan involving the use of prescription strength medications to include Ibuprofen 600-800 mg TID, versus 500-650 mg Tylenol. We also discussed prescribing topical diclofenac (Voltaren gel) as well as once daily Meloxicam 15 mg.\par (2) The patient has More Than One chronic injuries/illnesses as outlined, discussed, and documented by ICD 10 codes listed, as well as the HPI and Plan section.\par There is a moderate risk of morbidity with further treatment, especially from use of prescription strength medications and possible side effects of these medications which include upset stomach and cardiac/renal issues with long term use were discussed.\par (3) I recommended that the patient follow-up with their medical physician to discuss any significant specific potential issues with long term use such as interactions with current medications or with exacerbation of underlying medical morbidities. \par \par Attestation:\par I, Melissa Coronado, attest that this documentation has been prepared under the direction and in the presence of Provider Ajay Vazquez MD.\par \par \par \par

## 2022-11-04 NOTE — HISTORY OF PRESENT ILLNESS
[de-identified] : 56 yo female presenting with right shoulder pain x6 months. Denies any specific injury or trauma. Her pain is in the anterior and lateral aspect of her shoulder. States her pain has been getting progressively worse. Pain is worse with movement overhead. She presents with Stand up MRI of her right shoulder. She is referred by Dr. Gonzalez.\par 5/9/22: Patient returns today for repeat evaluation of right shoulder pain. She continues to report significant pain and discomfort. Patient has been completing HEP. ROM preserved. \par 7/22/22: Patient presents today for follow up of right shoulder pain. She states pain has been getting progressively worse. She has noticed worsening difficulty with overhead movement and activities of daily living. She wishes to discuss further options.\par 9/2/22: Patient returns today for repeat evaluation of right shoulder pain. She has full thickness rotator cuff tear. pain has been getting progressively worse. patient wishes to discuss surgery.\par 9/16/22: Patient presents 8 days s/p right arthroscopic rotator cuff repair (1 medial, 1 lateral with DOD), SAD and biceps tenodesis. Patient is doing well, pain is controlled. \par Patient has been compliant with the brace. Denies any fever, chills, drainage.\par 10/14/22: Patient presents 5 weeks s/p right arthroscopic rotator cuff repair (1 medial, 1 lateral with DOD), SAD and biceps tenodesis. Patient is doing well, pain is controlled. ROM is progressing with PT. \par 11/4/2022: Patient presents today for a follow-up visit 8 weeks s/p right arthroscopic rotator cuff repair (1 medial, 1 lateral with DOD), SAD and biceps tenodesis. Patient goes to physical therapy three times a week, notes associated pain and soreness afterwards. Patient's ROM is improving. \par

## 2022-11-04 NOTE — PHYSICAL EXAM
[de-identified] : Constitutional: The general appearance of the patient is well developed, well nourished, no deformities and well groomed. Normal \par \par Gait: Gait and function is as follows: normal gait. \par \par Skin: Head and neck visualized skin is normal. Left upper extremity visualized skin is normal. Right upper extremity visualized skin is normal. Thoracic Skin of the thoracic spine shows visualized skin is normal. \par \par Cardiovascular: palpable radial pulse bilaterally, good capillary refill in digits of the bilateral upper extremities and no temperature or color changes in the bilateral upper extremities. \par \par Lymphatic: Normal Palpation of lymph nodes in the cervical. \par \par Neurologic: fine motor control in the bilateral upper extremities is intact. Deep Tendon Reflexes in Upper and Lower Extremities Negative Bledsoe's in the bilateral upper extremities. The patient is oriented to time, place and person. Sensation to light touch intact in the bilateral upper extremities. Mood and Affect is normal. \par \par Right Shoulder: Inspection of the shoulder/upper arm is as follows: no scapula winging, no biceps deformity and no AC joint deformity. Palpation of the shoulder/upper arm is as follows: There is tenderness at the proximal biceps tendon. Range of motion of the shoulder is as follows: Pain with internal rotation, external rotation, abduction and forward flexion. Strength of the shoulder is as follows: Supraspinatus 4/5. External Rotation 4/5. Internal Rotation 4/5. Deltoid 5/5 Ligament Stability and Special Tests of the shoulder is as follows: Neer test is positive. Ovalle' test is positive. Speed's test is positive. \par \par Left Shoulder: Inspection of the shoulder/upper arm is as follows: There is a full, pain-free, stable range of motion of the shoulder with normal strength and no tenderness to palpation. \par \par Neck: \par Inspection / Palpation of the cervical spine is as follows: no paracervical tenderness. Range of motion of the cervical spine is as follows: moderately decreased range of motion of the cervical spine. Stability testing for the cervical spine is as follows Stable range of motion. \par \par \par Back, including spine: Inspection / Palpation of the thoracic/lumbar spine is as follows: There is a full, pain free, stable range of motion of the thoracic spine with a normal tone and not tenderness to palpation..\par

## 2022-11-13 ENCOUNTER — FORM ENCOUNTER (OUTPATIENT)
Age: 57
End: 2022-11-13

## 2022-11-27 ENCOUNTER — FORM ENCOUNTER (OUTPATIENT)
Age: 57
End: 2022-11-27

## 2022-11-28 ENCOUNTER — FORM ENCOUNTER (OUTPATIENT)
Age: 57
End: 2022-11-28

## 2022-12-09 ENCOUNTER — APPOINTMENT (OUTPATIENT)
Dept: ORTHOPEDIC SURGERY | Facility: CLINIC | Age: 57
End: 2022-12-09

## 2022-12-09 VITALS — WEIGHT: 155 LBS | HEIGHT: 65 IN | BODY MASS INDEX: 25.83 KG/M2

## 2022-12-09 DIAGNOSIS — M25.511 PAIN IN RIGHT SHOULDER: ICD-10-CM

## 2022-12-09 DIAGNOSIS — Z98.890 OTHER SPECIFIED POSTPROCEDURAL STATES: ICD-10-CM

## 2022-12-09 PROCEDURE — 99214 OFFICE O/P EST MOD 30 MIN: CPT

## 2022-12-09 NOTE — HISTORY OF PRESENT ILLNESS
[de-identified] : 58 yo female presenting with right shoulder pain x6 months. Denies any specific injury or trauma. Her pain is in the anterior and lateral aspect of her shoulder. States her pain has been getting progressively worse. Pain is worse with movement overhead. She presents with [facility] of her right shoulder. She is referred by Dr. Gonzalez.\par 5/9/22: Patient returns today for repeat evaluation of right shoulder pain. She continues to report significant pain and discomfort. Patient has been completing HEP. ROM preserved. \par 7/22/22: Patient presents today for follow up of right shoulder pain. She states pain has been getting progressively worse. She has noticed worsening difficulty with overhead movement and activities of daily living. She wishes to discuss further options.\par 9/2/22: Patient returns today for repeat evaluation of right shoulder pain. She has full thickness rotator cuff tear. pain has been getting progressively worse. patient wishes to discuss surgery.\par 9/16/22: Patient presents 8 days s/p right arthroscopic rotator cuff repair (1 medial, 1 lateral with DOD), SAD and biceps tenodesis. Patient is doing well, pain is controlled. \par Patient has been compliant with the brace. Denies any fever, chills, drainage.\par 10/14/22: Patient presents 5 weeks s/p right arthroscopic rotator cuff repair (1 medial, 1 lateral with DOD), SAD and biceps tenodesis. Patient is doing well, pain is controlled. ROM is progressing with PT. \par \par 11/4/2022: Patient presents today for a follow-up visit 8 weeks s/p right arthroscopic rotator cuff repair (1 medial, 1 lateral with DOD), SAD and biceps tenodesis. Patient goes to physical therapy three times a week, notes associated pain and soreness afterwards. Patient's ROM is improving. \par \par 12/9/22: Pt 3 months s/p s/p right arthroscopic rotator cuff repair (1 medial, 1 lateral with DOD), SAD and biceps tenodesis. She reports ROM is progressing but continues to experience moderate pain. She admits to lateral discomfort with constant radiating pain into her fingers. Pain is worse at night. She does report feeling of tingling/numbness. \par

## 2022-12-09 NOTE — DISCUSSION/SUMMARY
[de-identified] : This is a 58 yo female 3 months s/p right arthroscopic rotator cuff repair (1 medial, 1 lateral with DOD), subacromial decompression and mini open biceps tenodesis. \par patient is making steay improvement. \par ROM is progressing as expected at this point. \par Discussed her increase and persistent pain could possibly be referred by cervical spine considering she is experiencing radicular pain into her hand. \par Patient has mild residual stiffness to her shoulder which is likely exacerbated her underling cervical disease. \par Patient will make follow up appointment with spine specialist/pain management to discuss further treatment options with respect to her nerve related pain. \par She was provided updated PT prescription to progress according to medium protocol. \par \par We also discussed possibility of trying subacromial injection to help differentiae if her pain is coming from her shoulder vs c-spine. \par All incisions are healing well with no evidence of infection. \par Patient was provided PT prescription according to small protocol. She will continue x2 weekly\par \par Patient will follow up in 6 weeks.\par \par \par She was provided one additional prescription for pain medicine and is aware she should consult pain management or PCP for long term management. \par \par (1) We discussed a comprehensive treatment plans that included a prescription management plan involving the use of prescription strength medications to include Ibuprofen 600-800 mg TID, versus 500-650 mg Tylenol. We also discussed prescribing topical diclofenac (Voltaren gel) as well as once daily Meloxicam 15 mg.\par \par (2) The patient has More Than One chronic injuries/illnesses as outlined, discussed, and documented by ICD 10 codes listed, as well as the HPI and Plan section.\par There is a moderate risk of morbidity with further treatment, especially from use of prescription strength medications and possible side effects of these medications which include upset stomach and cardiac/renal issues with long term use were discussed.\par \par (3) I recommended that the patient follow-up with their medical physician to discuss any significant specific potential issues with long term use such as interactions with current medications or with exacerbation of underlying medical morbidities.\par \par \par Patient seen by Elijah Oden PA-C under the direct supervision of Ajay Vazquez M.D.\par \par \par \par \par

## 2022-12-09 NOTE — PHYSICAL EXAM
[de-identified] : Constitutional: The general appearance of the patient is well developed, well nourished, no deformities and well groomed. Normal \par \par Gait: Gait and function is as follows: normal gait. \par \par Skin: Head and neck visualized skin is normal. Left upper extremity visualized skin is normal. Right upper extremity visualized skin is normal. Thoracic Skin of the thoracic spine shows visualized skin is normal. \par \par Cardiovascular: palpable radial pulse bilaterally, good capillary refill in digits of the bilateral upper extremities and no temperature or color changes in the bilateral upper extremities. \par \par Lymphatic: Normal Palpation of lymph nodes in the cervical. \par \par Neurologic: fine motor control in the bilateral upper extremities is intact. Deep Tendon Reflexes in Upper and Lower Extremities Negative Bledsoe's in the bilateral upper extremities. The patient is oriented to time, place and person. Sensation to light touch intact in the bilateral upper extremities. Mood and Affect is normal. \par \par Right Shoulder: Inspection of the shoulder/upper arm is as follows: no scapula winging, no biceps deformity and no AC joint deformity. Palpation of the shoulder/upper arm is as follows: There is tenderness at the proximal biceps tendon. Range of motion of the shoulder is as follows: Pain with internal rotation, external rotation, abduction and forward flexion. Strength of the shoulder is as follows: Supraspinatus 4/5. External Rotation 4/5. Internal Rotation 4/5. Deltoid 5/5 Ligament Stability and Special Tests of the shoulder is as follows: Neer test is positive. Ovalle' test is positive. Speed's test is positive. \par \par Left Shoulder: Inspection of the shoulder/upper arm is as follows: There is a full, pain-free, stable range of motion of the shoulder with normal strength and no tenderness to palpation. \par \par Neck: \par Inspection / Palpation of the cervical spine is as follows: no paracervical tenderness. Range of motion of the cervical spine is as follows: moderately decreased range of motion of the cervical spine. Stability testing for the cervical spine is as follows Stable range of motion. \par \par \par Back, including spine: Inspection / Palpation of the thoracic/lumbar spine is as follows: There is a full, pain free, stable range of motion of the thoracic spine with a normal tone and not tenderness to palpation..\par

## 2022-12-16 ENCOUNTER — APPOINTMENT (OUTPATIENT)
Dept: ORTHOPEDIC SURGERY | Facility: CLINIC | Age: 57
End: 2022-12-16

## 2022-12-16 PROCEDURE — 72040 X-RAY EXAM NECK SPINE 2-3 VW: CPT

## 2022-12-16 PROCEDURE — 99214 OFFICE O/P EST MOD 30 MIN: CPT

## 2022-12-29 NOTE — PHYSICAL EXAM
[Normal Coordination] : normal coordination [Normal DTR UE/LE] : normal DTR UE/LE  [Normal Sensation] : normal sensation [Normal Mood and Affect] : normal mood and affect [Orientated] : orientated [Able to Communicate] : able to communicate [Normal Skin] : normal skin [No Rash] : no rash [No Ulcers] : no ulcers [No Lesions] : no lesions [No obvious lymphadenopathy in areas examined] : no obvious lymphadenopathy in areas examined [Well Developed] : well developed [Peripheral vascular exam is grossly normal] : peripheral vascular exam is grossly normal [No Respiratory Distress] : no respiratory distress [Biceps 2+] : biceps 2+ [Triceps 2+] : triceps 2+ [Brachioradialis 2+] : brachioradialis 2+ [] : negative Bledsoe reflex [FreeTextEntry1] : no fracture

## 2022-12-29 NOTE — DATA REVIEWED
[Cervical Spine] : cervical spine [I independently reviewed and interpreted images and report] : I independently reviewed and interpreted images and report [I reviewed the films/CD and additionally noted] : I reviewed the films/CD and additionally noted [FreeTextEntry1] : I stop paperwork reviewed\par Shoulder orthopedic progress notes reviewed

## 2022-12-29 NOTE — DISCUSSION/SUMMARY
[de-identified] : She will continue with home exercise and medications. Patient will continue with her current medication regimen with Lyrica, ibuprofen, and Percocet, renewal with lyrica. I am requesting a cervical spine MRI, eval for radiculopathy refractory to Lyrica, ibuprofen, Percocet, and home exercise rehab.. F/U after updated MRI. \par \par Prior to appointment and during encounter with patient extensive medical records were reviewed including but not limited to, hospital records, outpatient records, imaging results, and lab data.During this appointment the patient was examined, diagnoses were discussed and explained in a face to face manner. In addition extensive time was spent reviewing aforementioned diagnostic studies. Counseling including abnormal image results, differential diagnoses, treatment options, risk and benefits, lifestyle changes, current condition, and current medications was performed. Patient's comments, questions, and concerns were addressed and patient verbalized understanding. Based on this patient's presentation at our office, which is an orthopedic spine surgeon's office, this patient inherently / intrinsically has a risk, however minute, of developing issues such as Cauda equina syndrome, bowel and bladder changes, or progression of motor or neurological deficits such as paralysis which may be permanent. \par \par VANESSA TENORIO Acting as a Scribe for Dr. Villegas I, Vanessa Tenorio, attest that this documentation has been prepared under the direction and in the presence of Provider Bakari Gonzalez MD.

## 2022-12-29 NOTE — HISTORY OF PRESENT ILLNESS
[de-identified] : 12/16/2022 - The patient is a 57 year female who presents today follow up neck, left arm. PT has been helpful for her surgery pain. She has a shooting pain from her middle finger all the wall up her arm, pain has been there since her surgery (09/08/2022). Taking lyrica, ibuprofen and percocet for the pain, which helps the pain slightly. No positioning of the neck triggers the pain. \par \par Date of Injury/Onset:chronic\par Pain:    At Rest: 4/10 \par With Activity:  8/10 \par Quality of symptoms: throbbing, nerve pain\par Improves with: lyrica, ibuprofen, percocet\par Worse with: activity\par Working full time\par \par 10/07/2022 - Patient returns to the office for a follow up regarding lumbar spine. Symptoms essentially remain on changed since last visit.  Pain is in the central part of our lower back with some intimate symptoms into the leg. She continues to use lyrica. She remains satisfied with progress from surgery.\par \par Continues home exercises. Working full time. Patient is pleased with progress from surgery. \par \par Shoulder Surgery with Dr. Vazquez 09/08/2022

## 2023-01-03 ENCOUNTER — FORM ENCOUNTER (OUTPATIENT)
Age: 58
End: 2023-01-03

## 2023-01-04 RX ORDER — OXYCODONE AND ACETAMINOPHEN 10; 325 MG/1; MG/1
10-325 TABLET ORAL
Qty: 30 | Refills: 0 | Status: DISCONTINUED | COMMUNITY
Start: 2022-09-02 | End: 2023-01-04

## 2023-01-04 RX ORDER — HYDROCODONE BITARTRATE AND ACETAMINOPHEN 5; 300 MG/1; MG/1
5-300 TABLET ORAL EVERY 8 HOURS
Qty: 90 | Refills: 0 | Status: DISCONTINUED | COMMUNITY
Start: 2022-08-03 | End: 2023-01-04

## 2023-01-04 RX ORDER — PREGABALIN 75 MG/1
75 CAPSULE ORAL TWICE DAILY
Qty: 60 | Refills: 2 | Status: DISCONTINUED | COMMUNITY
Start: 2022-06-03 | End: 2023-01-04

## 2023-01-04 RX ORDER — OXYCODONE AND ACETAMINOPHEN 5; 325 MG/1; MG/1
5-325 TABLET ORAL
Qty: 12 | Refills: 0 | Status: DISCONTINUED | COMMUNITY
Start: 2022-12-09 | End: 2023-01-04

## 2023-01-04 RX ORDER — PREGABALIN 75 MG/1
75 CAPSULE ORAL
Qty: 60 | Refills: 0 | Status: DISCONTINUED | COMMUNITY
Start: 2022-10-07 | End: 2023-01-04

## 2023-01-04 RX ORDER — PREGABALIN 75 MG/1
75 CAPSULE ORAL TWICE DAILY
Qty: 60 | Refills: 1 | Status: DISCONTINUED | COMMUNITY
Start: 2022-08-03 | End: 2023-01-04

## 2023-01-04 RX ORDER — PREGABALIN 75 MG/1
75 CAPSULE ORAL TWICE DAILY
Qty: 60 | Refills: 1 | Status: DISCONTINUED | COMMUNITY
Start: 2022-10-07 | End: 2023-01-04

## 2023-01-04 RX ORDER — OXYCODONE AND ACETAMINOPHEN 5; 325 MG/1; MG/1
5-325 TABLET ORAL
Qty: 10 | Refills: 0 | Status: DISCONTINUED | COMMUNITY
Start: 2022-11-04 | End: 2023-01-04

## 2023-01-04 RX ORDER — OXYCODONE AND ACETAMINOPHEN 5; 325 MG/1; MG/1
5-325 TABLET ORAL
Qty: 15 | Refills: 0 | Status: DISCONTINUED | COMMUNITY
Start: 2022-09-21 | End: 2023-01-04

## 2023-01-06 ENCOUNTER — APPOINTMENT (OUTPATIENT)
Dept: ORTHOPEDIC SURGERY | Facility: CLINIC | Age: 58
End: 2023-01-06

## 2023-01-18 ENCOUNTER — APPOINTMENT (OUTPATIENT)
Dept: ORTHOPEDIC SURGERY | Facility: CLINIC | Age: 58
End: 2023-01-18
Payer: COMMERCIAL

## 2023-01-18 VITALS — HEIGHT: 65 IN | BODY MASS INDEX: 25.83 KG/M2 | WEIGHT: 155 LBS

## 2023-01-18 PROCEDURE — 20610 DRAIN/INJ JOINT/BURSA W/O US: CPT | Mod: RT

## 2023-01-18 PROCEDURE — J3490M: CUSTOM

## 2023-01-18 PROCEDURE — 99214 OFFICE O/P EST MOD 30 MIN: CPT | Mod: 25

## 2023-01-18 PROCEDURE — 72100 X-RAY EXAM L-S SPINE 2/3 VWS: CPT

## 2023-01-18 PROCEDURE — 73503 X-RAY EXAM HIP UNI 4/> VIEWS: CPT | Mod: RT

## 2023-01-18 RX ORDER — OXYCODONE AND ACETAMINOPHEN 10; 325 MG/1; MG/1
10-325 TABLET ORAL 4 TIMES DAILY
Qty: 60 | Refills: 0 | Status: ACTIVE | COMMUNITY
Start: 2023-01-18 | End: 1900-01-01

## 2023-01-18 RX ORDER — LIDOCAINE 5% 700 MG/1
5 PATCH TOPICAL
Qty: 30 | Refills: 1 | Status: ACTIVE | COMMUNITY
Start: 2023-01-18 | End: 1900-01-01

## 2023-01-18 RX ORDER — METHYLPREDNISOLONE 4 MG/1
4 TABLET ORAL
Qty: 1 | Refills: 0 | Status: ACTIVE | COMMUNITY
Start: 2023-01-18 | End: 1900-01-01

## 2023-01-20 ENCOUNTER — TRANSCRIPTION ENCOUNTER (OUTPATIENT)
Age: 58
End: 2023-01-20

## 2023-02-01 ENCOUNTER — APPOINTMENT (OUTPATIENT)
Dept: ORTHOPEDIC SURGERY | Facility: CLINIC | Age: 58
End: 2023-02-01
Payer: COMMERCIAL

## 2023-02-01 VITALS — HEIGHT: 65 IN | BODY MASS INDEX: 25.83 KG/M2 | WEIGHT: 155 LBS

## 2023-02-01 DIAGNOSIS — M70.61 TROCHANTERIC BURSITIS, RIGHT HIP: ICD-10-CM

## 2023-02-01 PROCEDURE — 99214 OFFICE O/P EST MOD 30 MIN: CPT

## 2023-02-01 RX ORDER — PREGABALIN 75 MG/1
75 CAPSULE ORAL TWICE DAILY
Qty: 60 | Refills: 1 | Status: ACTIVE | COMMUNITY
Start: 2023-02-01 | End: 1900-01-01

## 2023-02-01 RX ORDER — OXYCODONE 10 MG/1
10 TABLET ORAL EVERY 6 HOURS
Qty: 60 | Refills: 0 | Status: ACTIVE | COMMUNITY
Start: 2023-02-01 | End: 1900-01-01

## 2023-02-05 NOTE — DISCUSSION/SUMMARY
[de-identified] : She will continue with home exercise and medications. Patient will continue with her current medication regimen with Lyrica, ibuprofen, and Percocet. \par Discussed pathology of her symptoms associated with hip bursitis, possible trigger point injections if return of symptoms. She will follow up in 3-4 weeks. \par \par Prior to appointment and during encounter with patient extensive medical records were reviewed including but not limited to, hospital records, outpatient records, imaging results, and lab data.During this appointment the patient was examined, diagnoses were discussed and explained in a face to face manner. In addition extensive time was spent reviewing aforementioned diagnostic studies. Counseling including abnormal image results, differential diagnoses, treatment options, risk and benefits, lifestyle changes, current condition, and current medications was performed. Patient's comments, questions, and concerns were addressed and patient verbalized understanding. Based on this patient's presentation at our office, which is an orthopedic spine surgeon's office, this patient inherently / intrinsically has a risk, however minute, of developing issues such as Cauda equina syndrome, bowel and bladder changes, or progression of motor or neurological deficits such as paralysis which may be permanent. \par \par VANESSA TENORIO Acting as a Scribe for Dr. Nelly WAITE, Vanessa Tenorio, attest that this documentation has been prepared under the direction and in the presence of Provider Bakari Gonzalez MD.

## 2023-02-05 NOTE — HISTORY OF PRESENT ILLNESS
[de-identified] : 02/01/2023 - 56 y/o F presenting for a follow up. She reports aggravated right sided buttock/hip pain radiating to the left side of the stomach and back. Trigger point injection at her last into the visit into right trochanteric hip bursa provided relief for 3-4 days. Her recent back pain is a different character type of pain compared to preoperative pains. Neck pain still present, but low back pain is more prominent at this time. Ibuprofen, lyrica, Percocet, is helpful for symptom control. \par \par 12/16/2022 - The patient is a 57 year female who presents today follow up neck, left arm. PT has been helpful for her surgery pain. She has a shooting pain from her middle finger all the wall up her arm, pain has been there since her surgery (09/08/2022). Taking lyrica, ibuprofen and percocet for the pain, which helps the pain slightly. No positioning of the neck triggers the pain. \par \par Date of Injury/Onset:chronic\par Pain:    At Rest: 4/10 \par With Activity:  8/10 \par Quality of symptoms: throbbing, nerve pain\par Improves with: lyrica, ibuprofen, percocet\par Worse with: activity\par Working full time\par \par 10/07/2022 - Patient returns to the office for a follow up regarding lumbar spine. Symptoms essentially remain on changed since last visit.  Pain is in the central part of our lower back with some intimate symptoms into the leg. She continues to use lyrica. She remains satisfied with progress from surgery.\par \par Continues home exercises. Working full time. Patient is pleased with progress from surgery. \par \par Shoulder Surgery with Dr. Vazquez 09/08/2022

## 2023-02-05 NOTE — PHYSICAL EXAM
[Normal Coordination] : normal coordination [Normal DTR UE/LE] : normal DTR UE/LE  [Normal Sensation] : normal sensation [Normal Mood and Affect] : normal mood and affect [Orientated] : orientated [Able to Communicate] : able to communicate [Normal Skin] : normal skin [No Rash] : no rash [No Ulcers] : no ulcers [No Lesions] : no lesions [No obvious lymphadenopathy in areas examined] : no obvious lymphadenopathy in areas examined [Well Developed] : well developed [Peripheral vascular exam is grossly normal] : peripheral vascular exam is grossly normal [No Respiratory Distress] : no respiratory distress [Biceps 2+] : biceps 2+ [Triceps 2+] : triceps 2+ [Brachioradialis 2+] : brachioradialis 2+ [] : clonus not sustained at ankle

## 2023-02-12 ENCOUNTER — FORM ENCOUNTER (OUTPATIENT)
Age: 58
End: 2023-02-12

## 2023-02-14 NOTE — REASON FOR VISIT
[Spouse] : spouse [FreeTextEntry2] : Test follow up after mri of cervical spine, done at [facility] on 01/11/2023

## 2023-02-14 NOTE — HISTORY OF PRESENT ILLNESS
[Lower back] : lower back [] : yes [de-identified] : MRI @ [facility]  [de-identified] : \par \par \par \par \par \par \par 12/16/2022 - The patient is a 57 year female who presents today follow up neck, left arm. PT has been helpful for her surgery pain. She has a shooting pain from her middle finger all the wall up her arm, pain has been there since her surgery (09/08/2022). Taking lyrica, ibuprofen and percocet for the pain, which helps the pain slightly. No positioning of the neck triggers the pain. \par \par Date of Injury/Onset:chronic\par Pain:    At Rest: 4/10 \par With Activity:  8/10 \par Quality of symptoms: throbbing, nerve pain\par Improves with: lyrica, ibuprofen, percocet\par Worse with: activity\par Working full time\par \par 10/07/2022 - Patient returns to the office for a follow up regarding lumbar spine. Symptoms essentially remain on changed since last visit.  Pain is in the central part of our lower back with some intimate symptoms into the leg. She continues to use lyrica. She remains satisfied with progress from surgery.\par \par Continues home exercises. Working full time. Patient is pleased with progress from surgery. \par \par Shoulder Surgery with Dr. Vazquez 09/08/2022

## 2023-02-14 NOTE — DISCUSSION/SUMMARY
[de-identified] : She will continue with home exercise and medications. Patient will continue with her current medication regimen with Lyrica, ibuprofen, and Percocet and prescriptions are renewed\par . Today in office as part of ongoing treatment a large joint injection is administered into right trochanteric hip bursa to facilitate ROM and stretching. Injection consisted of 1cc Kenalog 40 and 4cc .25% Marcaine. \par \par Prior to appointment and during encounter with patient extensive medical records were reviewed including but not limited to, hospital records, outpatient records, imaging results, and lab data.During this appointment the patient was examined, diagnoses were discussed and explained in a face to face manner. In addition extensive time was spent reviewing aforementioned diagnostic studies. Counseling including abnormal image results, differential diagnoses, treatment options, risk and benefits, lifestyle changes, current condition, and current medications was performed. Patient's comments, questions, and concerns were addressed and patient verbalized understanding. Based on this patient's presentation at our office, which is an orthopedic spine surgeon's office, this patient inherently / intrinsically has a risk, however minute, of developing issues such as Cauda equina syndrome, bowel and bladder changes, or progression of motor or neurological deficits such as paralysis which may be permanent. \par \par VANESSA TENORIO Acting as a Scribe for Dr. Gonzalez\walter WAITE, Vanessa Tenorio, attest that this documentation has been prepared under the direction and in the presence of Provider Bakari Gonzalez MD.

## 2023-02-14 NOTE — DISCUSSION/SUMMARY
[de-identified] : She will continue with home exercise and medications. Patient will continue with her current medication regimen with Lyrica, ibuprofen, and Percocet and prescriptions are renewed\par . Today in office as part of ongoing treatment a large joint injection is administered into right trochanteric hip bursa to facilitate ROM and stretching. Injection consisted of 1cc Kenalog 40 and 4cc .25% Marcaine. \par \par Prior to appointment and during encounter with patient extensive medical records were reviewed including but not limited to, hospital records, outpatient records, imaging results, and lab data.During this appointment the patient was examined, diagnoses were discussed and explained in a face to face manner. In addition extensive time was spent reviewing aforementioned diagnostic studies. Counseling including abnormal image results, differential diagnoses, treatment options, risk and benefits, lifestyle changes, current condition, and current medications was performed. Patient's comments, questions, and concerns were addressed and patient verbalized understanding. Based on this patient's presentation at our office, which is an orthopedic spine surgeon's office, this patient inherently / intrinsically has a risk, however minute, of developing issues such as Cauda equina syndrome, bowel and bladder changes, or progression of motor or neurological deficits such as paralysis which may be permanent. \par \par VANESSA TENORIO Acting as a Scribe for Dr. Gonzalez\walter WAITE, Vanessa Tenorio, attest that this documentation has been prepared under the direction and in the presence of Provider Bakari Gonzalez MD.

## 2023-02-14 NOTE — HISTORY OF PRESENT ILLNESS
[Lower back] : lower back [] : yes [de-identified] : MRI @ [facility]  [de-identified] : \par \par \par \par \par \par \par 12/16/2022 - The patient is a 57 year female who presents today follow up neck, left arm. PT has been helpful for her surgery pain. She has a shooting pain from her middle finger all the wall up her arm, pain has been there since her surgery (09/08/2022). Taking lyrica, ibuprofen and percocet for the pain, which helps the pain slightly. No positioning of the neck triggers the pain. \par \par Date of Injury/Onset:chronic\par Pain:    At Rest: 4/10 \par With Activity:  8/10 \par Quality of symptoms: throbbing, nerve pain\par Improves with: lyrica, ibuprofen, percocet\par Worse with: activity\par Working full time\par \par 10/07/2022 - Patient returns to the office for a follow up regarding lumbar spine. Symptoms essentially remain on changed since last visit.  Pain is in the central part of our lower back with some intimate symptoms into the leg. She continues to use lyrica. She remains satisfied with progress from surgery.\par \par Continues home exercises. Working full time. Patient is pleased with progress from surgery. \par \par Shoulder Surgery with Dr. Vazquez 09/08/2022

## 2023-02-14 NOTE — PHYSICAL EXAM
[Normal Coordination] : normal coordination [Normal DTR UE/LE] : normal DTR UE/LE  [Normal Sensation] : normal sensation [Normal Mood and Affect] : normal mood and affect [Orientated] : orientated [Able to Communicate] : able to communicate [Normal Skin] : normal skin [No Rash] : no rash [No Ulcers] : no ulcers [No Lesions] : no lesions [No obvious lymphadenopathy in areas examined] : no obvious lymphadenopathy in areas examined [Well Developed] : well developed [Peripheral vascular exam is grossly normal] : peripheral vascular exam is grossly normal [No Respiratory Distress] : no respiratory distress [Biceps 2+] : biceps 2+ [Triceps 2+] : triceps 2+ [Brachioradialis 2+] : brachioradialis 2+ [Flexion] : flexion [Extension] : extension [] : clonus not sustained at ankle

## 2023-02-20 ENCOUNTER — FORM ENCOUNTER (OUTPATIENT)
Age: 58
End: 2023-02-20

## 2023-02-21 RX ORDER — NAPROXEN 500 MG/1
500 TABLET ORAL
Qty: 60 | Refills: 0 | Status: ACTIVE | COMMUNITY
Start: 2022-10-27 | End: 1900-01-01

## 2023-03-05 ENCOUNTER — FORM ENCOUNTER (OUTPATIENT)
Age: 58
End: 2023-03-05

## 2023-03-15 ENCOUNTER — APPOINTMENT (OUTPATIENT)
Dept: ORTHOPEDIC SURGERY | Facility: CLINIC | Age: 58
End: 2023-03-15
Payer: COMMERCIAL

## 2023-03-15 VITALS — BODY MASS INDEX: 25.83 KG/M2 | HEIGHT: 65 IN | WEIGHT: 155 LBS

## 2023-03-15 PROCEDURE — 99214 OFFICE O/P EST MOD 30 MIN: CPT

## 2023-03-15 RX ORDER — PREGABALIN 75 MG/1
75 CAPSULE ORAL TWICE DAILY
Qty: 60 | Refills: 1 | Status: ACTIVE | COMMUNITY
Start: 2023-03-15 | End: 1900-01-01

## 2023-03-16 NOTE — DISCUSSION/SUMMARY
[de-identified] : She will continue with home exercise and medications. Patient will continue with her current medication regimen with Lyrica, ibuprofen, and Percocet, renewals provided. Renewal provided for physical therapy. Discussed pathology of her symptoms associated with hip bursitis, possible trigger point injections if return of symptoms. She will follow up in 3-4 weeks. \par \par Prior to appointment and during encounter with patient extensive medical records were reviewed including but not limited to, hospital records, outpatient records, imaging results, and lab data.During this appointment the patient was examined, diagnoses were discussed and explained in a face to face manner. In addition extensive time was spent reviewing aforementioned diagnostic studies. Counseling including abnormal image results, differential diagnoses, treatment options, risk and benefits, lifestyle changes, current condition, and current medications was performed. Patient's comments, questions, and concerns were addressed and patient verbalized understanding. Based on this patient's presentation at our office, which is an orthopedic spine surgeon's office, this patient inherently / intrinsically has a risk, however minute, of developing issues such as Cauda equina syndrome, bowel and bladder changes, or progression of motor or neurological deficits such as paralysis which may be permanent. \par \par VANESSA TENORIO Acting as a Scribe for Dr. Nelly WAITE, Vanessa Tenorio, attest that this documentation has been prepared under the direction and in the presence of Provider Bakari Gonzalez MD.

## 2023-03-16 NOTE — HISTORY OF PRESENT ILLNESS
[de-identified] : 3/15/23: Patient presenting for a follow up. Patient has been doing well since her last visit. No significant changes. Severity of pain ranges day to day, worse with weather changes. She reports resolution of bursitis pain, but complains of axial lower back pain. Treatment with Lyrica provides relief of LE radic. Treatment with Percocet and Naprosyn provides relief for symptom control. Completing physical therapy for back and shoulder which has been helpful. \par \par 02/01/2023 - 56 y/o F presenting for a follow up. She reports aggravated right sided buttock/hip pain radiating to the left side of the stomach and back. Trigger point injection at her last into the visit into right trochanteric hip bursa provided relief for 3-4 days. Her recent back pain is a different character type of pain compared to preoperative pains. Neck pain still present, but low back pain is more prominent at this time. Ibuprofen, lyrica, Percocet, is helpful for symptom control. \par \par 12/16/2022 - The patient is a 57 year female who presents today follow up neck, left arm. PT has been helpful for her surgery pain. She has a shooting pain from her middle finger all the wall up her arm, pain has been there since her surgery (09/08/2022). Taking lyrica, ibuprofen and percocet for the pain, which helps the pain slightly. No positioning of the neck triggers the pain. \par \par Date of Injury/Onset:chronic\par Pain:    At Rest: 4/10 \par With Activity:  8/10 \par Quality of symptoms: throbbing, nerve pain\par Improves with: lyrica, ibuprofen, percocet\par Worse with: activity\par Working full time\par \par 10/07/2022 - Patient returns to the office for a follow up regarding lumbar spine. Symptoms essentially remain on changed since last visit.  Pain is in the central part of our lower back with some intimate symptoms into the leg. She continues to use lyrica. She remains satisfied with progress from surgery.\par \par Continues home exercises. Working full time. Patient is pleased with progress from surgery. \par \par Shoulder Surgery with Dr. Vazquez 09/08/2022 [FreeTextEntry5] : The patient is a 57 year female who presents today follow up low back pain\par Pain:    At Rest: 7/10 \par With Activity:  8/10 \par Treatment: physical therapy\par Change since last visit: \par Additional Information: None\par \par

## 2023-04-04 RX ORDER — OXYCODONE AND ACETAMINOPHEN 10; 325 MG/1; MG/1
10-325 TABLET ORAL
Qty: 60 | Refills: 0 | Status: ACTIVE | COMMUNITY
Start: 2023-04-04 | End: 1900-01-01

## 2023-04-17 ENCOUNTER — FORM ENCOUNTER (OUTPATIENT)
Age: 58
End: 2023-04-17

## 2023-04-18 RX ORDER — OXYCODONE AND ACETAMINOPHEN 10; 325 MG/1; MG/1
10-325 TABLET ORAL
Qty: 60 | Refills: 0 | Status: ACTIVE | COMMUNITY
Start: 2023-03-15 | End: 1900-01-01

## 2023-05-03 ENCOUNTER — APPOINTMENT (OUTPATIENT)
Dept: ORTHOPEDIC SURGERY | Facility: CLINIC | Age: 58
End: 2023-05-03
Payer: COMMERCIAL

## 2023-05-03 VITALS — WEIGHT: 155 LBS | HEIGHT: 65 IN | BODY MASS INDEX: 25.83 KG/M2

## 2023-05-03 DIAGNOSIS — M75.51 BURSITIS OF RIGHT SHOULDER: ICD-10-CM

## 2023-05-03 PROCEDURE — 99214 OFFICE O/P EST MOD 30 MIN: CPT

## 2023-05-03 NOTE — PHYSICAL EXAM
[Normal Coordination] : normal coordination [Normal DTR UE/LE] : normal DTR UE/LE  [Normal Sensation] : normal sensation [Normal Mood and Affect] : normal mood and affect [Orientated] : orientated [Able to Communicate] : able to communicate [Normal Skin] : normal skin [No Rash] : no rash [No Ulcers] : no ulcers [No Lesions] : no lesions [No obvious lymphadenopathy in areas examined] : no obvious lymphadenopathy in areas examined [Well Developed] : well developed [Peripheral vascular exam is grossly normal] : peripheral vascular exam is grossly normal [No Respiratory Distress] : no respiratory distress [Biceps 2+] : biceps 2+ [Triceps 2+] : triceps 2+ [Brachioradialis 2+] : brachioradialis 2+ [Right] : right shoulder [] : clonus not sustained at ankle [FreeTextEntry9] : ROM with pain

## 2023-05-03 NOTE — DISCUSSION/SUMMARY
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 \par I am referring the patient to pain management to discuss trying Cervical Epidural Spine Injections for pain relief at C6/7. \par She will continue with home exercise and medications. Patient will continue with her current medication regimen with Lyrica, Naprosyn, and Percocet, renewal provided for Perocet.  F/U 5/6 WKS. \par \par Prior to appointment and during encounter with patient extensive medical records were reviewed including but not limited to, hospital records, outpatient records, imaging results, and lab data.During this appointment the patient was examined, diagnoses were discussed and explained in a face to face manner. In addition extensive time was spent reviewing aforementioned diagnostic studies. Counseling including abnormal image results, differential diagnoses, treatment options, risk and benefits, lifestyle changes, current condition, and current medications was performed. Patient's comments, questions, and concerns were addressed and patient verbalized understanding. Based on this patient's presentation at our office, which is an orthopedic spine surgeon's office, this patient inherently / intrinsically has a risk, however minute, of developing issues such as Cauda equina syndrome, bowel and bladder changes, or progression of motor or neurological deficits such as paralysis which may be permanent. \par \par VANESSA TENORIO Acting as a Scribe for Dr. Gonzalez\par MARIANN, Vanessa Tenorio, attest that this documentation has been prepared under the direction and in the presence of Provider Bakari Gonzalez MD.

## 2023-05-03 NOTE — HISTORY OF PRESENT ILLNESS
[de-identified] : 5/3/23: Patient presenting in follow up. Complains of tingling throughout the right upper extremity, worse at nighttime. Positional movements are able to reduce the severity of paresthesias, but remains significant.  Back pain controlled at this time with prescribed medications. Treatment with Percocet, Lyrica, and Naprosyn provides relief for symptom control. \par \par 3/15/23: Patient presenting for a follow up. Patient has been doing well since her last visit. No significant changes. Severity of pain ranges day to day, worse with weather changes. She reports resolution of bursitis pain, but complains of axial lower back pain. Treatment with Lyrica provides relief of LE radic. Treatment with Percocet and Naprosyn provides relief for symptom control. Completing physical therapy for back and shoulder which has been helpful. \par \par 02/01/2023 - 56 y/o F presenting for a follow up. She reports aggravated right sided buttock/hip pain radiating to the left side of the stomach and back. Trigger point injection at her last into the visit into right trochanteric hip bursa provided relief for 3-4 days. Her recent back pain is a different character type of pain compared to preoperative pains. Neck pain still present, but low back pain is more prominent at this time. Ibuprofen, lyrica, Percocet, is helpful for symptom control. \par \par 12/16/2022 - The patient is a 57 year female who presents today follow up neck, left arm. PT has been helpful for her surgery pain. She has a shooting pain from her middle finger all the wall up her arm, pain has been there since her surgery (09/08/2022). Taking lyrica, ibuprofen and percocet for the pain, which helps the pain slightly. No positioning of the neck triggers the pain. \par \par Date of Injury/Onset:chronic\par Pain:    At Rest: 4/10 \par With Activity:  8/10 \par Quality of symptoms: throbbing, nerve pain\par Improves with: lyrica, ibuprofen, percocet\par Worse with: activity\par Working full time\par \par 10/07/2022 - Patient returns to the office for a follow up regarding lumbar spine. Symptoms essentially remain on changed since last visit.  Pain is in the central part of our lower back with some intimate symptoms into the leg. She continues to use lyrica. She remains satisfied with progress from surgery.\par \par Continues home exercises. Working full time. Patient is pleased with progress from surgery. \par \par Shoulder Surgery with Dr. Vazquez 09/08/2022 [FreeTextEntry5] : The patient is a 57 year female who presents today follow up low back pain\par Pain:    At Rest: 7/10 \par With Activity:  8/10 \par Treatment: physical therapy\par Change since last visit: \par Additional Information: None\par \par

## 2023-05-31 ENCOUNTER — FORM ENCOUNTER (OUTPATIENT)
Age: 58
End: 2023-05-31

## 2023-06-08 ENCOUNTER — APPOINTMENT (OUTPATIENT)
Dept: PAIN MANAGEMENT | Facility: CLINIC | Age: 58
End: 2023-06-08
Payer: COMMERCIAL

## 2023-06-21 ENCOUNTER — APPOINTMENT (OUTPATIENT)
Dept: ORTHOPEDIC SURGERY | Facility: CLINIC | Age: 58
End: 2023-06-21
Payer: COMMERCIAL

## 2023-06-21 VITALS — HEIGHT: 65 IN | WEIGHT: 155 LBS | BODY MASS INDEX: 25.83 KG/M2

## 2023-06-21 PROCEDURE — 72100 X-RAY EXAM L-S SPINE 2/3 VWS: CPT

## 2023-06-21 PROCEDURE — 99214 OFFICE O/P EST MOD 30 MIN: CPT

## 2023-06-29 NOTE — PHYSICAL EXAM
[Normal Coordination] : normal coordination [Normal DTR UE/LE] : normal DTR UE/LE  [Normal Sensation] : normal sensation [Normal Mood and Affect] : normal mood and affect [Orientated] : orientated [Able to Communicate] : able to communicate [Normal Skin] : normal skin [No Rash] : no rash [No Ulcers] : no ulcers [No Lesions] : no lesions [No obvious lymphadenopathy in areas examined] : no obvious lymphadenopathy in areas examined [Well Developed] : well developed [Peripheral vascular exam is grossly normal] : peripheral vascular exam is grossly normal [No Respiratory Distress] : no respiratory distress [Biceps 2+] : biceps 2+ [Triceps 2+] : triceps 2+ [Brachioradialis 2+] : brachioradialis 2+ [Right] : right shoulder [FreeTextEntry9] : ROM with pain  [] : clonus not sustained at ankle

## 2023-06-29 NOTE — DISCUSSION/SUMMARY
[de-identified] : XRAY taken today lumbar demonstrates good maintenance of alignment and implant placement. \par Patient will keep holding appointment with pain management in July to discuss treatment with interventional spinal injections\par She will continue with home exercise and medications. Patient will continue with her current medication regimen with Lyrica, Naprosyn, and Percocet, renewal provided for Lyrica. F/U 5/6 WKS.\par \par Prior to appointment and during encounter with patient extensive medical records were reviewed including but not limited to, hospital records, outpatient records, imaging results, and lab data.During this appointment the patient was examined, diagnoses were discussed and explained in a face to face manner. In addition extensive time was spent reviewing aforementioned diagnostic studies. Counseling including abnormal image results, differential diagnoses, treatment options, risk and benefits, lifestyle changes, current condition, and current medications was performed. Patient's comments, questions, and concerns were addressed and patient verbalized understanding. Based on this patient's presentation at our office, which is an orthopedic spine surgeon's office, this patient inherently / intrinsically has a risk, however minute, of developing issues such as Cauda equina syndrome, bowel and bladder changes, or progression of motor or neurological deficits such as paralysis which may be permanent. \par \par VANESSA TENORIO Acting as a Scribe for Dr. Gonzalez\walter WAITE, Vanessa Tenorio, attest that this documentation has been prepared under the direction and in the presence of Provider Bakari Gonzalez MD.

## 2023-06-29 NOTE — HISTORY OF PRESENT ILLNESS
[de-identified] : 06/21/2023 - Patient presenting in follow up. Chief complaint of numbness throughout the left lower extremity. Severity is worse at nighttime, more controlled throughout the day. Scheduled for appointment with PM end of July. Admits to neck pain, present day leg pain > neck pain. Treatment with Percocet, Lyrica, and Naprosyn provides relief for symptom control. No change to general medical health. \par \par 5/3/23: Patient presenting in follow up. Complains of tingling throughout the right upper extremity, worse at nighttime. Positional movements are able to reduce the severity of paresthesias, but remains significant.  Back pain controlled at this time with prescribed medications. Treatment with Percocet, Lyrica, and Naprosyn provides relief for symptom control. \par \par 3/15/23: Patient presenting for a follow up. Patient has been doing well since her last visit. No significant changes. Severity of pain ranges day to day, worse with weather changes. She reports resolution of bursitis pain, but complains of axial lower back pain. Treatment with Lyrica provides relief of LE radic. Treatment with Percocet and Naprosyn provides relief for symptom control. Completing physical therapy for back and shoulder which has been helpful. \par \par 02/01/2023 - 58 y/o F presenting for a follow up. She reports aggravated right sided buttock/hip pain radiating to the left side of the stomach and back. Trigger point injection at her last into the visit into right trochanteric hip bursa provided relief for 3-4 days. Her recent back pain is a different character type of pain compared to preoperative pains. Neck pain still present, but low back pain is more prominent at this time. Ibuprofen, lyrica, Percocet, is helpful for symptom control. \par \par 12/16/2022 - The patient is a 57 year female who presents today follow up neck, left arm. PT has been helpful for her surgery pain. She has a shooting pain from her middle finger all the wall up her arm, pain has been there since her surgery (09/08/2022). Taking lyrica, ibuprofen and percocet for the pain, which helps the pain slightly. No positioning of the neck triggers the pain. \par \par Date of Injury/Onset:chronic\par Pain:    At Rest: 4/10 \par With Activity:  8/10 \par Quality of symptoms: throbbing, nerve pain\par Improves with: lyrica, ibuprofen, percocet\par Worse with: activity\par Working full time\par \par 10/07/2022 - Patient returns to the office for a follow up regarding lumbar spine. Symptoms essentially remain on changed since last visit.  Pain is in the central part of our lower back with some intimate symptoms into the leg. She continues to use lyrica. She remains satisfied with progress from surgery.\par \par Continues home exercises. Working full time. Patient is pleased with progress from surgery. \par \par Shoulder Surgery with Dr. Vazquez 09/08/2022 [FreeTextEntry5] : The patient is a 57 year female who presents today follow up low back pain\par Pain:    At Rest: 7/10 \par With Activity:  8/10 \par Treatment: physical therapy\par Change since last visit: \par Additional Information: None\par \par

## 2023-07-02 ENCOUNTER — FORM ENCOUNTER (OUTPATIENT)
Age: 58
End: 2023-07-02

## 2023-07-17 ENCOUNTER — APPOINTMENT (OUTPATIENT)
Dept: PAIN MANAGEMENT | Facility: CLINIC | Age: 58
End: 2023-07-17
Payer: COMMERCIAL

## 2023-07-17 VITALS — WEIGHT: 155 LBS | HEIGHT: 65 IN | BODY MASS INDEX: 25.83 KG/M2

## 2023-07-17 DIAGNOSIS — Z98.1 ARTHRODESIS STATUS: ICD-10-CM

## 2023-07-17 DIAGNOSIS — M96.1 POSTLAMINECTOMY SYNDROME, NOT ELSEWHERE CLASSIFIED: ICD-10-CM

## 2023-07-17 PROCEDURE — 99214 OFFICE O/P EST MOD 30 MIN: CPT
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
## 2023-07-17 NOTE — PHYSICAL EXAM
[de-identified] : Constitutional:  \par - No acute distress  \par - Well developed; well nourished  \par \par Neurological:  \par - normal mood and affect  \par - alert and oriented x 3   \par \par Cardiovascular:  \par - grossly normal \par \par Lumbar Spine Exam: \par \par Inspection: \par erythema (-) \par ecchymosis (-) \par rashes (-) \par alignment: no scoliosis \par Well-healed midline surgical scar\par \par Palpation: \par Midline lumbar tenderness:            (-) \par midline thoracic tenderness:          (-) \par Lumbar paraspinal tenderness:  L (-) ; R (-) \par thoracic paraspinal tenderness: L (-) ; R (-) \par sciatic nerve tenderness :          L (+) ; R (-) \par SI joint tenderness:                     L (-) ; R (-) \par GTB tenderness:                        L (-);  R (-) \par \par ROM: WNL\par Pain with extremes of extension\par \par Strength: \par                                    Right       Left    \par Hip Flexion:                (5/5)       (5/5) \par Quadriceps:               (5/5)       (5/5) \par Hamstrings:                (5/5)       (5/5) \par Ankle Dorsiflexion:     (5/5)       (5/5) \par EHL:                           (5/5)       (5/5) \par Ankle Plantarflexion:  (5/5)       (5/5) \par \par Special Tests: \par SLR:                            R (-) ; L (+) \par Facet loading:             R (-) ; L (-) \par JUSTA test:                R (-) ; L (-) \par Hamstring tightness:   R (-);  L (-) \par \par Neurologic: \par SILT throughout right lower extremity \par SILT throughout left lower extremity \par \par Reflexes normal and symmetric bilateral lower extremities \par \par Gait: \par non- antalgic gait

## 2023-07-17 NOTE — ASSESSMENT
[FreeTextEntry1] : A thorough discussion occurred regarding available pain management treatment options including interventional,\par rehabilitative, pharmacological, and alternative modalities with the patient. We will proceed with the following:\par \par Interventional treatment options:\par - Proceed with caudal JACLNY (80 mg Kenalog) with fluoroscopic guidance\par - Patient may be candidate for DCS trial with failure of conservative care - not discussed\par - see additional instructions below\par \par Rehabilitative options:\par - Completed physical therapy trials for lumbar spine within recent history\par - Encouraged active participation in HEP as tolerated\par \par Medication based treatment options:\par - continue duloxetine 60 mg daily\par - Continue Lyrica 75 mg BID; further titration based upon clinical response\par - opioid management as per orthopedic surgery; previously counseled regarding lack of long-term benefit with chronic opioid use and encouraged down titration and cessation\par - see additional instructions below\par \par Complementary treatment options:\par - Weight management and lifestyle modifications discussed\par \par Additional treatment recommendations as follows:\par - follow up with Dr. Gonzalez as directed\par - Follow up 1-2 weeks post injection for assessment of efficacy and further treatment recommendations\par \par The risks, benefits and alternatives of the proposed procedure were explained in detail with the patient. The risks outlined include but are not limited to infection, bleeding, post- dural puncture headache, nerve injury, a temporary increase in pain, failure to resolve symptoms, allergic reaction, and possible elevation of blood sugar in diabetics if using corticosteroid.  All questions were answered to patient's apparent satisfaction and he/she verbalized an understanding.\par \par The documentation recorded by the scribe, in my presence, accurately reflects the service I personally performed and the decisions made by me with my edits as appropriate. \par \par I, Elijah Webb acting as scribe, attest that this documentation has been prepared under the direction and in the presence of Provider Bk Thompson DO.

## 2023-08-09 ENCOUNTER — APPOINTMENT (OUTPATIENT)
Dept: PAIN MANAGEMENT | Facility: CLINIC | Age: 58
End: 2023-08-09
Payer: COMMERCIAL

## 2023-08-09 DIAGNOSIS — M54.16 RADICULOPATHY, LUMBAR REGION: ICD-10-CM

## 2023-08-09 PROCEDURE — 62323 NJX INTERLAMINAR LMBR/SAC: CPT

## 2023-08-09 NOTE — PROCEDURE
[FreeTextEntry3] : Date of Service: 08/09/2023   Account: [id number]  Patient: SHIKHA AHUJA   YOB: 1965  Age: 58 year  Surgeon:      Bk Thompson DO  Assistant:    None  Pre-Operative Diagnosis:         Lumbar radiculopathy  Post Operative Diagnosis:       Lumbar radiculopathy  Procedure:             Caudal epidural steroid injection with fluoroscopic guidance  Anesthesia:            MAC  This procedure was carried out using fluoroscopic guidance.  The risks and benefits of the procedure were discussed extensively with the patient.  The consent of the patient was obtained and the following procedure was performed. The patient was placed in the prone position on the fluoroscopy table and the area was prepped and draped in a sterile fashion.  A timeout was performed with all essential staff present and the site and side were verified.  The sacrum was visualized using a lateral  fluoroscopic view.   Using sterile technique the superficial skin was anesthetized with 1% Lidocaine.  A 22 gauge 3.5 spinal needle was advanced under fluoroscopy until the sacrococcygeal ligament was engaged.  After confirmation of needle placement in the epidural space, the needle was advanced to approximately the S3 level.  After negative aspiration for heme and CSF, 3 cc of Omnipaque confirmed an appropriate lumbar epidurogram.  An A/P view was used to confirm dye spread.  An injectate of 8cc of preservative free normal saline with 80 mg of Kenalog was then injected into the epidural space.  Vital signs remained normal throughout the procedure.  The patient tolerated the procedure well.  There were no immediate complications from the performed procedure.  The patient was instructed to apply ice over the injection sites for twenty minutes every two hours for the next 24 hours.  Disposition:      1.  The patient was advised to F/U in 1-2 weeks to assess the response to the injection.      2.  The patient was also instructed to contact me immediately if there were any concerns related to the procedure performed.

## 2023-08-17 ENCOUNTER — APPOINTMENT (OUTPATIENT)
Dept: ORTHOPEDIC SURGERY | Facility: CLINIC | Age: 58
End: 2023-08-17
Payer: COMMERCIAL

## 2023-08-17 VITALS — BODY MASS INDEX: 25.83 KG/M2 | WEIGHT: 155 LBS | HEIGHT: 65 IN

## 2023-08-17 PROCEDURE — 99214 OFFICE O/P EST MOD 30 MIN: CPT

## 2023-08-17 RX ORDER — OXYCODONE 10 MG/1
10 TABLET ORAL EVERY 6 HOURS
Qty: 60 | Refills: 0 | Status: ACTIVE | COMMUNITY
Start: 2023-08-17 | End: 1900-01-01

## 2023-08-17 NOTE — DISCUSSION/SUMMARY
[de-identified] : Patient will follow up for indicated surgery with Dr. Ramirez to treat broken left wrist next week. Patient was provided with a prescription for oxycodone. Lumbar spine treatments on hold as she recovers from fracture.   Prior to appointment and during encounter with patient extensive medical records were reviewed including but not limited to, hospital records, outpatient records, imaging results, and lab data.During this appointment the patient was examined, diagnoses were discussed and explained in a face to face manner. In addition extensive time was spent reviewing aforementioned diagnostic studies. Counseling including abnormal image results, differential diagnoses, treatment options, risk and benefits, lifestyle changes, current condition, and current medications was performed. Patient's comments, questions, and concerns were addressed and patient verbalized understanding. Based on this patient's presentation at our office, which is an orthopedic spine surgeon's office, this patient inherently / intrinsically has a risk, however minute, of developing issues such as Cauda equina syndrome, bowel and bladder changes, or progression of motor or neurological deficits such as paralysis which may be permanent.   VANESSA TENORIO Acting as a Scribe for Dr. Carlos WAITE, Vanessa Tenorio, attest that this documentation has been prepared under the direction and in the presence of Provider Bakari Gonzalez MD.

## 2023-08-17 NOTE — HISTORY OF PRESENT ILLNESS
[10] : 10 [Sharp] : sharp [Constant] : constant [Meds] : meds [de-identified] : 08/17/2023 - Patient presenting in follow up. Patient slipped and fell yesterday morning at 5:30 AM due to leg cramping, resulting in broken radius and ulnar, she is scheduled for emergency surgery on Tuesday for Left wrist. Has been treating this with percoet and pain is breaking through.   06/21/2023 - Patient presenting in follow up. Chief complaint of numbness throughout the left lower extremity. Severity is worse at nighttime, more controlled throughout the day. Scheduled for appointment with PM end of July. Admits to neck pain, present day leg pain > neck pain. Treatment with Percocet, Lyrica, and Naprosyn provides relief for symptom control. No change to general medical health.   5/3/23: Patient presenting in follow up. Complains of tingling throughout the right upper extremity, worse at nighttime. Positional movements are able to reduce the severity of paresthesias, but remains significant.  Back pain controlled at this time with prescribed medications. Treatment with Percocet, Lyrica, and Naprosyn provides relief for symptom control.   3/15/23: Patient presenting for a follow up. Patient has been doing well since her last visit. No significant changes. Severity of pain ranges day to day, worse with weather changes. She reports resolution of bursitis pain, but complains of axial lower back pain. Treatment with Lyrica provides relief of LE radic. Treatment with Percocet and Naprosyn provides relief for symptom control. Completing physical therapy for back and shoulder which has been helpful.   02/01/2023 - 58 y/o F presenting for a follow up. She reports aggravated right sided buttock/hip pain radiating to the left side of the stomach and back. Trigger point injection at her last into the visit into right trochanteric hip bursa provided relief for 3-4 days. Her recent back pain is a different character type of pain compared to preoperative pains. Neck pain still present, but low back pain is more prominent at this time. Ibuprofen, lyrica, Percocet, is helpful for symptom control.   12/16/2022 - The patient is a 57 year female who presents today follow up neck, left arm. PT has been helpful for her surgery pain. She has a shooting pain from her middle finger all the wall up her arm, pain has been there since her surgery (09/08/2022). Taking lyrica, ibuprofen and percocet for the pain, which helps the pain slightly. No positioning of the neck triggers the pain.   Date of Injury/Onset:chronic Pain:    At Rest: 4/10  With Activity:  8/10  Quality of symptoms: throbbing, nerve pain Improves with: lyrica, ibuprofen, percocet Worse with: activity Working full time  10/07/2022 - Patient returns to the office for a follow up regarding lumbar spine. Symptoms essentially remain on changed since last visit.  Pain is in the central part of our lower back with some intimate symptoms into the leg. She continues to use lyrica. She remains satisfied with progress from surgery.  Continues home exercises. Working full time. Patient is pleased with progress from surgery.   Shoulder Surgery with Dr. Vazquez 09/08/2022 [] : no [FreeTextEntry1] : L-spine [FreeTextEntry5] : Pt is here for follow up on L-spine. States that painkillers do not help manage the pain. States she had a fall yesterday and landed on her back and arm. States she cannot feel anything besides her arm.

## 2023-08-17 NOTE — DATA REVIEWED
[FreeTextEntry1] : I stop paperwork reviewed Pain mgmt progress notes reviewed Case discussed with hand orthopedics

## 2023-08-24 ENCOUNTER — APPOINTMENT (OUTPATIENT)
Dept: PAIN MANAGEMENT | Facility: CLINIC | Age: 58
End: 2023-08-24

## 2023-09-07 RX ORDER — OXYCODONE 10 MG/1
10 TABLET ORAL EVERY 6 HOURS
Qty: 60 | Refills: 0 | Status: ACTIVE | COMMUNITY
Start: 2023-09-07 | End: 1900-01-01

## 2023-09-15 ENCOUNTER — APPOINTMENT (OUTPATIENT)
Dept: ORTHOPEDIC SURGERY | Facility: CLINIC | Age: 58
End: 2023-09-15

## 2023-09-15 VITALS — HEIGHT: 65 IN | WEIGHT: 155 LBS | BODY MASS INDEX: 25.83 KG/M2

## 2023-09-28 RX ORDER — OXYCODONE AND ACETAMINOPHEN 5; 325 MG/1; MG/1
5-325 TABLET ORAL
Qty: 120 | Refills: 0 | Status: ACTIVE | COMMUNITY
Start: 2023-05-03 | End: 1900-01-01

## 2023-10-02 ENCOUNTER — TRANSCRIPTION ENCOUNTER (OUTPATIENT)
Age: 58
End: 2023-10-02

## 2023-10-04 ENCOUNTER — APPOINTMENT (OUTPATIENT)
Dept: ORTHOPEDIC SURGERY | Facility: CLINIC | Age: 58
End: 2023-10-04
Payer: COMMERCIAL

## 2023-10-04 VITALS — HEIGHT: 65 IN | BODY MASS INDEX: 25.83 KG/M2 | WEIGHT: 155 LBS

## 2023-10-04 PROCEDURE — 72100 X-RAY EXAM L-S SPINE 2/3 VWS: CPT

## 2023-10-04 PROCEDURE — 99214 OFFICE O/P EST MOD 30 MIN: CPT

## 2023-11-03 ENCOUNTER — RX RENEWAL (OUTPATIENT)
Age: 58
End: 2023-11-03

## 2023-11-03 ENCOUNTER — TRANSCRIPTION ENCOUNTER (OUTPATIENT)
Age: 58
End: 2023-11-03

## 2023-11-03 RX ORDER — PREGABALIN 75 MG/1
75 CAPSULE ORAL TWICE DAILY
Qty: 60 | Refills: 1 | Status: ACTIVE | COMMUNITY
Start: 2023-06-21 | End: 1900-01-01

## 2023-11-13 RX ORDER — OXYCODONE 5 MG/1
5 TABLET ORAL EVERY 6 HOURS
Qty: 60 | Refills: 0 | Status: ACTIVE | COMMUNITY
Start: 2023-11-13 | End: 1900-01-01

## 2023-11-28 ENCOUNTER — TRANSCRIPTION ENCOUNTER (OUTPATIENT)
Age: 58
End: 2023-11-28

## 2023-11-28 RX ORDER — OXYCODONE 5 MG/1
5 TABLET ORAL EVERY 6 HOURS
Qty: 60 | Refills: 0 | Status: ACTIVE | COMMUNITY
Start: 2023-11-28 | End: 1900-01-01

## 2023-12-12 RX ORDER — OXYCODONE 5 MG/1
5 TABLET ORAL EVERY 6 HOURS
Qty: 60 | Refills: 0 | Status: ACTIVE | COMMUNITY
Start: 2023-12-12 | End: 1900-01-01

## 2023-12-15 ENCOUNTER — APPOINTMENT (OUTPATIENT)
Dept: ORTHOPEDIC SURGERY | Facility: CLINIC | Age: 58
End: 2023-12-15
Payer: COMMERCIAL

## 2023-12-15 VITALS — BODY MASS INDEX: 25.83 KG/M2 | WEIGHT: 155 LBS | HEIGHT: 65 IN

## 2023-12-15 DIAGNOSIS — M54.12 RADICULOPATHY, CERVICAL REGION: ICD-10-CM

## 2023-12-15 PROCEDURE — 99214 OFFICE O/P EST MOD 30 MIN: CPT

## 2023-12-17 PROBLEM — M54.12 CERVICAL RADICULOPATHY, ACUTE: Status: ACTIVE | Noted: 2022-12-16

## 2023-12-17 NOTE — DATA REVIEWED
[Outside X-rays] : outside x-rays [Thoracic Spine] : thoracic spine [Report was reviewed and noted in the chart] : The report was reviewed and noted in the chart [I independently reviewed and interpreted images and report] : I independently reviewed and interpreted images and report [FreeTextEntry1] : I stop paperwork reviewed

## 2023-12-17 NOTE — HISTORY OF PRESENT ILLNESS
[10] : 10 [Sharp] : sharp [Constant] : constant [Meds] : meds [de-identified] : 12/15/2023 - Patient presenting in follow up.  She was diagnosed with bronchitis; the patient had a recent X-ray of the lungs that revealed incidental finding. She is here for a review of these unexpected findings. Has been weening off oxycodone.   10/04/2023 - Patient presenting in follow up. Overall neck and low back pain is stable but flare up of symptoms are recent shingles diagnosis. Treated with antiviral for shingles and symptoms are starting to improve this week. Continues to recover from radius/ulnar surgery. Treating with pain killer as prescribed with relief, she is weening down.   08/17/2023 - Patient presenting in follow up. Patient slipped and fell yesterday morning at 5:30 AM due to leg cramping, resulting in broken radius and ulnar, she is scheduled for emergency surgery on Tuesday for Left wrist. Has been treating this with percoet and pain is breaking through.   06/21/2023 - Patient presenting in follow up. Chief complaint of numbness throughout the left lower extremity. Severity is worse at nighttime, more controlled throughout the day. Scheduled for appointment with PM end of July. Admits to neck pain, present day leg pain > neck pain. Treatment with Percocet, Lyrica, and Naprosyn provides relief for symptom control. No change to general medical health.   5/3/23: Patient presenting in follow up. Complains of tingling throughout the right upper extremity, worse at nighttime. Positional movements are able to reduce the severity of paresthesias, but remains significant.  Back pain controlled at this time with prescribed medications. Treatment with Percocet, Lyrica, and Naprosyn provides relief for symptom control.   3/15/23: Patient presenting for a follow up. Patient has been doing well since her last visit. No significant changes. Severity of pain ranges day to day, worse with weather changes. She reports resolution of bursitis pain, but complains of axial lower back pain. Treatment with Lyrica provides relief of LE radic. Treatment with Percocet and Naprosyn provides relief for symptom control. Completing physical therapy for back and shoulder which has been helpful.   02/01/2023 - 58 y/o F presenting for a follow up. She reports aggravated right sided buttock/hip pain radiating to the left side of the stomach and back. Trigger point injection at her last into the visit into right trochanteric hip bursa provided relief for 3-4 days. Her recent back pain is a different character type of pain compared to preoperative pains. Neck pain still present, but low back pain is more prominent at this time. Ibuprofen, lyrica, Percocet, is helpful for symptom control.   12/16/2022 - The patient is a 57 year female who presents today follow up neck, left arm. PT has been helpful for her surgery pain. She has a shooting pain from her middle finger all the wall up her arm, pain has been there since her surgery (09/08/2022). Taking lyrica, ibuprofen and percocet for the pain, which helps the pain slightly. No positioning of the neck triggers the pain.   Date of Injury/Onset:chronic Pain:    At Rest: 4/10  With Activity:  8/10  Quality of symptoms: throbbing, nerve pain Improves with: lyrica, ibuprofen, percocet Worse with: activity Working full time  10/07/2022 - Patient returns to the office for a follow up regarding lumbar spine. Symptoms essentially remain on changed since last visit.  Pain is in the central part of our lower back with some intimate symptoms into the leg. She continues to use lyrica. She remains satisfied with progress from surgery.  Continues home exercises. Working full time. Patient is pleased with progress from surgery.   Shoulder Surgery with Dr. Vazquez 09/08/2022 [] : no [FreeTextEntry1] : L-spine [FreeTextEntry5] : Pt is here for follow up on L-spine. States that painkillers do not help manage the pain. States she had a fall yesterday and landed on her back and arm. States she cannot feel anything besides her arm.

## 2023-12-27 RX ORDER — OXYCODONE 5 MG/1
5 TABLET ORAL EVERY 6 HOURS
Qty: 60 | Refills: 0 | Status: ACTIVE | COMMUNITY
Start: 2023-12-27 | End: 1900-01-01

## 2024-01-11 RX ORDER — NAPROXEN 500 MG/1
500 TABLET ORAL
Qty: 60 | Refills: 0 | Status: ACTIVE | COMMUNITY
Start: 2023-03-15 | End: 1900-01-01

## 2024-02-02 ENCOUNTER — APPOINTMENT (OUTPATIENT)
Dept: ORTHOPEDIC SURGERY | Facility: CLINIC | Age: 59
End: 2024-02-02
Payer: COMMERCIAL

## 2024-02-02 VITALS — BODY MASS INDEX: 25.83 KG/M2 | HEIGHT: 65 IN | WEIGHT: 155 LBS

## 2024-02-02 DIAGNOSIS — Z78.9 OTHER SPECIFIED HEALTH STATUS: ICD-10-CM

## 2024-02-02 PROCEDURE — 99214 OFFICE O/P EST MOD 30 MIN: CPT

## 2024-02-02 RX ORDER — OXYCODONE AND ACETAMINOPHEN 5; 325 MG/1; MG/1
5-325 TABLET ORAL EVERY 6 HOURS
Qty: 120 | Refills: 0 | Status: ACTIVE | COMMUNITY
Start: 2024-02-02 | End: 1900-01-01

## 2024-02-02 NOTE — REVIEW OF SYSTEMS
[Joint Pain] : joint pain [Joint Stiffness] : joint stiffness [Negative] : Heme/Lymph Closure 2 Information: This tab is for additional flaps and grafts, including complex repair and grafts and complex repair and flaps. You can also specify a different location for the additional defect, if the location is the same you do not need to select a new one. We will insert the automated text for the repair you select below just as we do for solitary flaps and grafts. Please note that at this time if you select a location with a different insurance zone you will need to override the ICD10 and CPT if appropriate.

## 2024-02-04 NOTE — DISCUSSION/SUMMARY
[de-identified] : Discussed continued medical mgmt and exercise based rehabilitation. Patient will continue medical management with oxycodone, discussed continued weening. Prescription renewed. Discussed continued home exercise program.   Prior to appointment and during encounter with patient extensive medical records were reviewed including but not limited to, hospital records, outpatient records, imaging results, and lab data.During this appointment the patient was examined, diagnoses were discussed and explained in a face-to-face manner. In addition extensive time was spent reviewing aforementioned diagnostic studies. Counseling including abnormal image results, differential diagnoses, treatment options, risk and benefits, lifestyle changes, current condition, and current medications was performed. Patient's comments, questions, and concerns were addressed and patient verbalized understanding. Based on this patient's presentation at our office, which is an orthopedic spine surgeon's office, this patient inherently / intrinsically has a risk, however minute, of developing issues such as Cauda equina syndrome, bowel and bladder changes, or progression of motor or neurological deficits such as paralysis which may be permanent.   VANESSA TENORIO Acting as a Scribe for Dr. Carlos WAITE, Vanessa Tenorio, attest that this documentation has been prepared under the direction and in the presence of Provider Bakari Gonzalez MD.

## 2024-02-28 RX ORDER — OXYCODONE AND ACETAMINOPHEN 5; 325 MG/1; MG/1
5-325 TABLET ORAL EVERY 6 HOURS
Qty: 120 | Refills: 0 | Status: ACTIVE | COMMUNITY
Start: 2022-12-16 | End: 1900-01-01

## 2024-03-03 ENCOUNTER — RX RENEWAL (OUTPATIENT)
Age: 59
End: 2024-03-03

## 2024-03-29 RX ORDER — OXYCODONE AND ACETAMINOPHEN 5; 325 MG/1; MG/1
5-325 TABLET ORAL EVERY 6 HOURS
Qty: 120 | Refills: 0 | Status: ACTIVE | COMMUNITY
Start: 2024-03-29 | End: 1900-01-01

## 2024-03-29 RX ORDER — NAPROXEN 500 MG/1
500 TABLET ORAL TWICE DAILY
Qty: 60 | Refills: 1 | Status: ACTIVE | COMMUNITY
Start: 2024-03-29 | End: 1900-01-01

## 2024-04-29 ENCOUNTER — APPOINTMENT (OUTPATIENT)
Dept: ORTHOPEDIC SURGERY | Facility: CLINIC | Age: 59
End: 2024-04-29
Payer: COMMERCIAL

## 2024-04-29 VITALS — BODY MASS INDEX: 25.83 KG/M2 | WEIGHT: 155 LBS | HEIGHT: 65 IN

## 2024-04-29 DIAGNOSIS — M48.061 SPINAL STENOSIS, LUMBAR REGION WITHOUT NEUROGENIC CLAUDICATION: ICD-10-CM

## 2024-04-29 DIAGNOSIS — M51.26 OTHER INTERVERTEBRAL DISC DISPLACEMENT, LUMBAR REGION: ICD-10-CM

## 2024-04-29 PROCEDURE — 99214 OFFICE O/P EST MOD 30 MIN: CPT

## 2024-04-29 RX ORDER — OXYCODONE 5 MG/1
5 TABLET ORAL
Qty: 90 | Refills: 0 | Status: ACTIVE | COMMUNITY
Start: 2023-10-25 | End: 1900-01-01

## 2024-04-29 RX ORDER — PREGABALIN 75 MG/1
75 CAPSULE ORAL
Qty: 60 | Refills: 0 | Status: ACTIVE | COMMUNITY
Start: 2022-12-16 | End: 1900-01-01

## 2024-05-06 PROBLEM — M48.061 SPINAL STENOSIS OF LUMBAR REGION WITHOUT NEUROGENIC CLAUDICATION: Status: ACTIVE | Noted: 2022-06-03

## 2024-05-06 PROBLEM — M51.26 LUMBAR DISC HERNIATION: Status: ACTIVE | Noted: 2022-04-20

## 2024-05-06 NOTE — PHYSICAL EXAM
[Normal Coordination] : normal coordination [Normal DTR UE/LE] : normal DTR UE/LE  [Normal Sensation] : normal sensation [Normal Mood and Affect] : normal mood and affect [Oriented] : oriented [Able to Communicate] : able to communicate [Normal Skin] : normal skin [No Rash] : no rash [No Ulcers] : no ulcers [No Lesions] : no lesions [No obvious lymphadenopathy in areas examined] : no obvious lymphadenopathy in areas examined [Well Developed] : well developed [Peripheral vascular exam is grossly normal] : peripheral vascular exam is grossly normal [No Respiratory Distress] : no respiratory distress [Biceps 2+] : biceps 2+ [Triceps 2+] : triceps 2+ [Brachioradialis 2+] : brachioradialis 2+ [] : clonus not sustained at ankle

## 2024-05-06 NOTE — DISCUSSION/SUMMARY
[de-identified] : Istop Completed. Medications renewed. We discussed weaning schedule. She will try to reduce the oxycodone tablets to three times per day. Hopefully at the next visit we can reduce this to twice daily. She will continue with home exercise protocol, core muscle strengthening, activity, modifications, proper body, mechanics, and home strengthening.  Prior to appointment and during encounter with patient extensive medical records were reviewed including but not limited to, hospital records, outpatient records, imaging results, and lab data.During this appointment the patient was examined, diagnoses were discussed and explained in a face-to-face manner. In addition extensive time was spent reviewing aforementioned diagnostic studies. Counseling including abnormal image results, differential diagnoses, treatment options, risk and benefits, lifestyle changes, current condition, and current medications was performed. Patient's comments, questions, and concerns were addressed and patient verbalized understanding. Based on this patient's presentation at our office, which is an orthopedic spine surgeon's office, this patient inherently / intrinsically has a risk, however minute, of developing issues such as Cauda equina syndrome, bowel and bladder changes, or progression of motor or neurological deficits such as paralysis which may be permanent.   GRACE RICHARDS acting as a Scribe for Dr. Carlos WAITE, Grace Richards, attest that this documentation has been prepared under the direction and in the presence of Provider Bakari Gonzalez MD.

## 2024-05-06 NOTE — HISTORY OF PRESENT ILLNESS
[5] : 5 [3] : 3 [Full time] : Work status: full time [de-identified] : 04/29/2024: Patient returns for routine follow up. No new complaints since last visit. Continue to use Lyrica and oxycodone to manage pain. Low back pain radiation into the left leg continues to try and wean down off the medication taking between three and four tablets a day of the oxycodone.  02/02/2024 - The patient has returned for a follow-up and notes an overall increase in pain attributed to changes in weather. Specifically, she reports experiencing low back pain with radiation into the left leg. Additionally, she mentions being actively involved in a gym program and is in the process of gradually reducing her intake of oxycodone.   12/15/2023 - Patient presenting in follow up.  She was diagnosed with bronchitis; the patient had a recent X-ray of the lungs that revealed incidental finding. She is here for a review of these unexpected findings. Has been weening off oxycodone.   10/04/2023 - Patient presenting in follow up. Overall neck and low back pain is stable but flare up of symptoms are recent shingles diagnosis. Treated with antiviral for shingles and symptoms are starting to improve this week. Continues to recover from radius/ulnar surgery. Treating with pain killer as prescribed with relief, she is weening down.   08/17/2023 - Patient presenting in follow up. Patient slipped and fell yesterday morning at 5:30 AM due to leg cramping, resulting in broken radius and ulnar, she is scheduled for emergency surgery on Tuesday for Left wrist. Has been treating this with percoet and pain is breaking through.   06/21/2023 - Patient presenting in follow up. Chief complaint of numbness throughout the left lower extremity. Severity is worse at nighttime, more controlled throughout the day. Scheduled for appointment with PM end of July. Admits to neck pain, present day leg pain > neck pain. Treatment with Percocet, Lyrica, and Naprosyn provides relief for symptom control. No change to general medical health.   5/3/23: Patient presenting in follow up. Complains of tingling throughout the right upper extremity, worse at nighttime. Positional movements are able to reduce the severity of paresthesias, but remains significant.  Back pain controlled at this time with prescribed medications. Treatment with Percocet, Lyrica, and Naprosyn provides relief for symptom control.   3/15/23: Patient presenting for a follow up. Patient has been doing well since her last visit. No significant changes. Severity of pain ranges day to day, worse with weather changes. She reports resolution of bursitis pain, but complains of axial lower back pain. Treatment with Lyrica provides relief of LE radic. Treatment with Percocet and Naprosyn provides relief for symptom control. Completing physical therapy for back and shoulder which has been helpful.   02/01/2023 - 56 y/o F presenting for a follow up. She reports aggravated right sided buttock/hip pain radiating to the left side of the stomach and back. Trigger point injection at her last into the visit into right trochanteric hip bursa provided relief for 3-4 days. Her recent back pain is a different character type of pain compared to preoperative pains. Neck pain still present, but low back pain is more prominent at this time. Ibuprofen, lyrica, Percocet, is helpful for symptom control.   12/16/2022 - The patient is a 57 year female who presents today follow up neck, left arm. PT has been helpful for her surgery pain. She has a shooting pain from her middle finger all the wall up her arm, pain has been there since her surgery (09/08/2022). Taking lyrica, ibuprofen and percocet for the pain, which helps the pain slightly. No positioning of the neck triggers the pain.   Date of Injury/Onset:chronic Pain:    At Rest: 4/10  With Activity:  8/10  Quality of symptoms: throbbing, nerve pain Improves with: lyrica, ibuprofen, percocet Worse with: activity Working full time  10/07/2022 - Patient returns to the office for a follow up regarding lumbar spine. Symptoms essentially remain on changed since last visit.  Pain is in the central part of our lower back with some intimate symptoms into the leg. She continues to use lyrica. She remains satisfied with progress from surgery.  Continues home exercises. Working full time. Patient is pleased with progress from surgery.   Shoulder Surgery with Dr. Vazquez 09/08/2022

## 2024-05-29 RX ORDER — OXYCODONE AND ACETAMINOPHEN 5; 325 MG/1; MG/1
5-325 TABLET ORAL EVERY 6 HOURS
Qty: 120 | Refills: 0 | Status: ACTIVE | COMMUNITY
Start: 2024-05-29 | End: 1900-01-01

## 2024-06-26 RX ORDER — OXYCODONE AND ACETAMINOPHEN 5; 325 MG/1; MG/1
5-325 TABLET ORAL EVERY 6 HOURS
Qty: 120 | Refills: 0 | Status: ACTIVE | COMMUNITY
Start: 2024-06-26 | End: 1900-01-01

## 2024-06-26 RX ORDER — PREGABALIN 75 MG/1
75 CAPSULE ORAL TWICE DAILY
Qty: 60 | Refills: 1 | Status: ACTIVE | COMMUNITY
Start: 2024-06-26 | End: 1900-01-01

## 2024-06-26 RX ORDER — NAPROXEN 500 MG/1
500 TABLET ORAL
Qty: 60 | Refills: 1 | Status: ACTIVE | COMMUNITY
Start: 2024-06-26 | End: 1900-01-01

## 2024-07-24 ENCOUNTER — APPOINTMENT (OUTPATIENT)
Dept: ORTHOPEDIC SURGERY | Facility: CLINIC | Age: 59
End: 2024-07-24
Payer: COMMERCIAL

## 2024-07-24 VITALS — HEIGHT: 65 IN | BODY MASS INDEX: 25.83 KG/M2 | WEIGHT: 155 LBS

## 2024-07-24 DIAGNOSIS — M48.061 SPINAL STENOSIS, LUMBAR REGION WITHOUT NEUROGENIC CLAUDICATION: ICD-10-CM

## 2024-07-24 PROCEDURE — 99214 OFFICE O/P EST MOD 30 MIN: CPT

## 2024-07-24 RX ORDER — OXYCODONE 5 MG/1
5 TABLET ORAL 4 TIMES DAILY
Qty: 120 | Refills: 0 | Status: ACTIVE | COMMUNITY
Start: 2024-07-24 | End: 1900-01-01

## 2024-07-24 RX ORDER — PREGABALIN 75 MG/1
75 CAPSULE ORAL TWICE DAILY
Qty: 60 | Refills: 1 | Status: ACTIVE | COMMUNITY
Start: 2024-07-24 | End: 1900-01-01

## 2024-07-24 NOTE — DISCUSSION/SUMMARY
[de-identified] : Discussed continued medical mgmt and exercise based rehabilitation. Patient will continue medical management with oxycodone, discussed continued weening. Prescription renewed. Discussed continued home exercise program. An mri is requested of the lumbar spine to eval for progressive stenosis at L3-4 above psf at L4-S1 given left radicular pain is in a L4 nerve root distribution, patient has worsening radiculopathy despite medications and HEP, patients last mri is approx 2 yrs old. Follow up after mri.   Prior to appointment and during encounter with patient extensive medical records were reviewed including but not limited to, hospital records, outpatient records, imaging results, and lab data.During this appointment the patient was examined, diagnoses were discussed and explained in a face-to-face manner. In addition extensive time was spent reviewing aforementioned diagnostic studies. Counseling including abnormal image results, differential diagnoses, treatment options, risk and benefits, lifestyle changes, current condition, and current medications was performed. Patient's comments, questions, and concerns were addressed and patient verbalized understanding. Based on this patient's presentation at our office, which is an orthopedic spine surgeon's office, this patient inherently / intrinsically has a risk, however minute, of developing issues such as Cauda equina syndrome, bowel and bladder changes, or progression of motor or neurological deficits such as paralysis which may be permanent.   VANESSA TENORIO Acting as a Scribe for Dr. Carlos WAITE, Vanessa Tenorio, attest that this documentation has been prepared under the direction and in the presence of Provider Bakari Gonzalez MD.

## 2024-07-24 NOTE — DATA REVIEWED
[Outside X-rays] : outside x-rays [Lumbar Spine] : lumbar spine [I independently reviewed and interpreted images and report] : I independently reviewed and interpreted images and report [FreeTextEntry1] : I stop paperwork reviewed (4) no limitation

## 2024-07-24 NOTE — HISTORY OF PRESENT ILLNESS
[5] : 5 [3] : 3 [Full time] : Work status: full time [de-identified] : 07/24/2024 - Patient presenting in follow up. She complains of significant numbness in the distal anterior left leg. Went to podiatrist thinking it was related to her foot, a cortisone injection in the foot did not provide any relief of this numbness. No change to general medical health. Last lumbar mri is from 2022. Continues medical management with oxycodone as prescribed.   04/29/2024: Patient returns for routine follow up. No new complaints since last visit. Continue to use Lyrica and oxycodone to manage pain. Low back pain radiation into the left leg continues to try and wean down off the medication taking between three and four tablets a day of the oxycodone.  02/02/2024 - The patient has returned for a follow-up and notes an overall increase in pain attributed to changes in weather. Specifically, she reports experiencing low back pain with radiation into the left leg. Additionally, she mentions being actively involved in a gym program and is in the process of gradually reducing her intake of oxycodone.   12/15/2023 - Patient presenting in follow up.  She was diagnosed with bronchitis; the patient had a recent X-ray of the lungs that revealed incidental finding. She is here for a review of these unexpected findings. Has been weening off oxycodone.   10/04/2023 - Patient presenting in follow up. Overall neck and low back pain is stable but flare up of symptoms are recent shingles diagnosis. Treated with antiviral for shingles and symptoms are starting to improve this week. Continues to recover from radius/ulnar surgery. Treating with pain killer as prescribed with relief, she is weening down.   08/17/2023 - Patient presenting in follow up. Patient slipped and fell yesterday morning at 5:30 AM due to leg cramping, resulting in broken radius and ulnar, she is scheduled for emergency surgery on Tuesday for Left wrist. Has been treating this with percoet and pain is breaking through.   06/21/2023 - Patient presenting in follow up. Chief complaint of numbness throughout the left lower extremity. Severity is worse at nighttime, more controlled throughout the day. Scheduled for appointment with PM end of July. Admits to neck pain, present day leg pain > neck pain. Treatment with Percocet, Lyrica, and Naprosyn provides relief for symptom control. No change to general medical health.   5/3/23: Patient presenting in follow up. Complains of tingling throughout the right upper extremity, worse at nighttime. Positional movements are able to reduce the severity of paresthesias, but remains significant.  Back pain controlled at this time with prescribed medications. Treatment with Percocet, Lyrica, and Naprosyn provides relief for symptom control.   3/15/23: Patient presenting for a follow up. Patient has been doing well since her last visit. No significant changes. Severity of pain ranges day to day, worse with weather changes. She reports resolution of bursitis pain, but complains of axial lower back pain. Treatment with Lyrica provides relief of LE radic. Treatment with Percocet and Naprosyn provides relief for symptom control. Completing physical therapy for back and shoulder which has been helpful.   02/01/2023 - 58 y/o F presenting for a follow up. She reports aggravated right sided buttock/hip pain radiating to the left side of the stomach and back. Trigger point injection at her last into the visit into right trochanteric hip bursa provided relief for 3-4 days. Her recent back pain is a different character type of pain compared to preoperative pains. Neck pain still present, but low back pain is more prominent at this time. Ibuprofen, lyrica, Percocet, is helpful for symptom control.   12/16/2022 - The patient is a 57 year female who presents today follow up neck, left arm. PT has been helpful for her surgery pain. She has a shooting pain from her middle finger all the wall up her arm, pain has been there since her surgery (09/08/2022). Taking lyrica, ibuprofen and percocet for the pain, which helps the pain slightly. No positioning of the neck triggers the pain.   Date of Injury/Onset:chronic Pain:    At Rest: 4/10  With Activity:  8/10  Quality of symptoms: throbbing, nerve pain Improves with: lyrica, ibuprofen, percocet Worse with: activity Working full time  10/07/2022 - Patient returns to the office for a follow up regarding lumbar spine. Symptoms essentially remain on changed since last visit.  Pain is in the central part of our lower back with some intimate symptoms into the leg. She continues to use lyrica. She remains satisfied with progress from surgery.  Continues home exercises. Working full time. Patient is pleased with progress from surgery.   Shoulder Surgery with Dr. Vazquez 09/08/2022

## 2024-07-31 ENCOUNTER — APPOINTMENT (OUTPATIENT)
Dept: ORTHOPEDIC SURGERY | Facility: CLINIC | Age: 59
End: 2024-07-31

## 2024-07-31 VITALS — HEIGHT: 65 IN | BODY MASS INDEX: 25.83 KG/M2 | WEIGHT: 155 LBS

## 2024-07-31 DIAGNOSIS — M54.16 RADICULOPATHY, LUMBAR REGION: ICD-10-CM

## 2024-07-31 DIAGNOSIS — M51.26 OTHER INTERVERTEBRAL DISC DISPLACEMENT, LUMBAR REGION: ICD-10-CM

## 2024-07-31 PROCEDURE — 99214 OFFICE O/P EST MOD 30 MIN: CPT

## 2024-07-31 NOTE — HISTORY OF PRESENT ILLNESS
[5] : 5 [3] : 3 [Full time] : Work status: full time [de-identified] :  07/31/2024: Patient presents today for review of her MRI. Patient reports her symptoms are the same. c/o left leg pain. No c/o significant LBP.  No constitutional symptoms. No fever / chills.   07/24/2024 - Patient presenting in follow up. She complains of significant numbness in the distal anterior left leg. Went to podiatrist thinking it was related to her foot, a cortisone injection in the foot did not provide any relief of this numbness. No change to general medical health. Last lumbar mri is from 2022. Continues medical management with oxycodone as prescribed.   04/29/2024: Patient returns for routine follow up. No new complaints since last visit. Continue to use Lyrica and oxycodone to manage pain. Low back pain radiation into the left leg continues to try and wean down off the medication taking between three and four tablets a day of the oxycodone.  02/02/2024 - The patient has returned for a follow-up and notes an overall increase in pain attributed to changes in weather. Specifically, she reports experiencing low back pain with radiation into the left leg. Additionally, she mentions being actively involved in a gym program and is in the process of gradually reducing her intake of oxycodone.   12/15/2023 - Patient presenting in follow up.  She was diagnosed with bronchitis; the patient had a recent X-ray of the lungs that revealed incidental finding. She is here for a review of these unexpected findings. Has been weening off oxycodone.   10/04/2023 - Patient presenting in follow up. Overall neck and low back pain is stable but flare up of symptoms are recent shingles diagnosis. Treated with antiviral for shingles and symptoms are starting to improve this week. Continues to recover from radius/ulnar surgery. Treating with pain killer as prescribed with relief, she is weening down.   08/17/2023 - Patient presenting in follow up. Patient slipped and fell yesterday morning at 5:30 AM due to leg cramping, resulting in broken radius and ulnar, she is scheduled for emergency surgery on Tuesday for Left wrist. Has been treating this with percoet and pain is breaking through.   06/21/2023 - Patient presenting in follow up. Chief complaint of numbness throughout the left lower extremity. Severity is worse at nighttime, more controlled throughout the day. Scheduled for appointment with PM end of July. Admits to neck pain, present day leg pain > neck pain. Treatment with Percocet, Lyrica, and Naprosyn provides relief for symptom control. No change to general medical health.   5/3/23: Patient presenting in follow up. Complains of tingling throughout the right upper extremity, worse at nighttime. Positional movements are able to reduce the severity of paresthesias, but remains significant.  Back pain controlled at this time with prescribed medications. Treatment with Percocet, Lyrica, and Naprosyn provides relief for symptom control.   3/15/23: Patient presenting for a follow up. Patient has been doing well since her last visit. No significant changes. Severity of pain ranges day to day, worse with weather changes. She reports resolution of bursitis pain, but complains of axial lower back pain. Treatment with Lyrica provides relief of LE radic. Treatment with Percocet and Naprosyn provides relief for symptom control. Completing physical therapy for back and shoulder which has been helpful.   02/01/2023 - 58 y/o F presenting for a follow up. She reports aggravated right sided buttock/hip pain radiating to the left side of the stomach and back. Trigger point injection at her last into the visit into right trochanteric hip bursa provided relief for 3-4 days. Her recent back pain is a different character type of pain compared to preoperative pains. Neck pain still present, but low back pain is more prominent at this time. Ibuprofen, lyrica, Percocet, is helpful for symptom control.   12/16/2022 - The patient is a 57 year female who presents today follow up neck, left arm. PT has been helpful for her surgery pain. She has a shooting pain from her middle finger all the wall up her arm, pain has been there since her surgery (09/08/2022). Taking lyrica, ibuprofen and percocet for the pain, which helps the pain slightly. No positioning of the neck triggers the pain.   Date of Injury/Onset:chronic Pain:    At Rest: 4/10  With Activity:  8/10  Quality of symptoms: throbbing, nerve pain Improves with: lyrica, ibuprofen, percocet Worse with: activity Working full time  10/07/2022 - Patient returns to the office for a follow up regarding lumbar spine. Symptoms essentially remain on changed since last visit.  Pain is in the central part of our lower back with some intimate symptoms into the leg. She continues to use lyrica. She remains satisfied with progress from surgery.  Continues home exercises. Working full time. Patient is pleased with progress from surgery.   Shoulder Surgery with Dr. Vazquez 09/08/2022

## 2024-07-31 NOTE — DISCUSSION/SUMMARY
[de-identified] : Discussed continued medical mgmt and exercise based rehabilitation. Patient will continue medical management with oxycodone, discussed continued weening. Prescription renewed. Discussed continued home exercise program.  I spoke to [facility] and they do not do diffusion weighted imaging so the patient will obtain blood work to rule out infection. 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  Prior to appointment and during encounter with patient extensive medical records were reviewed including but not limited to, hospital records, outpatient records, imaging results, and lab data. During this appointment the patient was examined, diagnoses were discussed and explained in a face-to-face manner. In addition extensive time was spent reviewing aforementioned diagnostic studies. Counseling including abnormal image results, differential diagnoses, treatment options, risk and benefits, lifestyle changes, current condition, and current medications was performed. Patient's comments, questions, and concerns were addressed and patient verbalized understanding. Based on this patient's presentation at our office, which is an orthopedic spine surgeon's office, this patient inherently / intrinsically has a risk, however minute, of developing issues such as Cauda equina syndrome, bowel and bladder changes, or progression of motor or neurological deficits such as paralysis which may be permanent.

## 2024-07-31 NOTE — DATA REVIEWED
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

## 2024-08-26 RX ORDER — PREGABALIN 75 MG/1
75 CAPSULE ORAL TWICE DAILY
Qty: 60 | Refills: 2 | Status: ACTIVE | COMMUNITY
Start: 2024-08-26 | End: 1900-01-01

## 2024-08-26 RX ORDER — OXYCODONE 5 MG/1
5 TABLET ORAL 4 TIMES DAILY
Qty: 120 | Refills: 0 | Status: ACTIVE | COMMUNITY
Start: 2024-08-26 | End: 1900-01-01

## 2024-09-11 ENCOUNTER — APPOINTMENT (OUTPATIENT)
Dept: ORTHOPEDIC SURGERY | Facility: CLINIC | Age: 59
End: 2024-09-11

## 2024-10-28 RX ORDER — OXYCODONE 5 MG/1
5 TABLET ORAL EVERY 6 HOURS
Qty: 120 | Refills: 0 | Status: ACTIVE | COMMUNITY
Start: 2024-10-28 | End: 1900-01-01

## 2024-10-28 RX ORDER — PREGABALIN 75 MG/1
75 CAPSULE ORAL TWICE DAILY
Qty: 60 | Refills: 2 | Status: ACTIVE | COMMUNITY
Start: 2024-10-28 | End: 1900-01-01

## 2024-11-12 NOTE — H&P PST ADULT - ENERGY EXPENDITURE (METS)
Keep a list of your medicines with you. List all of the prescription medicines, nonprescription medicines, supplements, natural remedies, and vitamins that you take. Tell your healthcare providers who treat you about all of the products you are taking. Your provider can provide you with a form to keep track of them. Just ask.  Follow the directions that come with your medicine, including information about food or alcohol. Make sure you know how and when to take your medicine. Do not take more or less than you are supposed to take.  Keep all medicines out of the reach of children.  Store medicines according to the directions on the label.  Monitor yourself. Learn to know how your body reacts to your new medicine and keep track of how it makes you feel before attempting (If your provider has allowed you to do so) to drive or go to work.   Seek emergency medical attention if you think you have used too much of this medicine. An overdose of any prescription medicine can be fatal. Overdose symptoms may include extreme drowsiness, muscle weakness, confusion, cold and clammy skin, pinpoint pupils, shallow breathing, slow heart rate, fainting, or coma.  Don't share prescription medicines with others, even when they seem to have the same symptoms. What may be good for you may be harmful to others.  If you are no longer taking a prescribed medication and you have pills left please take your pills out of their original containers. Mix crushed pills with an undesirable substance, such as cat litter or used coffee grounds. Put the mixture into a disposable container with a lid, such as an empty margarine tub, or into a sealable bag.  Cover up or remove any of your personal information on the empty containers by covering it with black permanent marker or duct tape.  Place the sealed container with the mixture, and the empty drug containers, in the trash.   If you use a medication that is in the form of a patch, dispose of used 
<4

## 2024-11-13 ENCOUNTER — RX RENEWAL (OUTPATIENT)
Age: 59
End: 2024-11-13

## 2024-11-28 RX ORDER — OXYCODONE 5 MG/1
5 TABLET ORAL 4 TIMES DAILY
Qty: 120 | Refills: 0 | Status: ACTIVE | COMMUNITY
Start: 2024-11-28 | End: 1900-01-01

## 2024-11-28 RX ORDER — PREGABALIN 75 MG/1
75 CAPSULE ORAL TWICE DAILY
Qty: 60 | Refills: 1 | Status: ACTIVE | COMMUNITY
Start: 2024-11-28 | End: 1900-01-01

## 2025-01-28 RX ORDER — OXYCODONE 5 MG/1
5 TABLET ORAL 4 TIMES DAILY
Qty: 120 | Refills: 0 | Status: ACTIVE | COMMUNITY
Start: 2025-01-28 | End: 1900-01-01

## 2025-03-03 ENCOUNTER — APPOINTMENT (OUTPATIENT)
Dept: ORTHOPEDIC SURGERY | Facility: CLINIC | Age: 60
End: 2025-03-03
Payer: COMMERCIAL

## 2025-03-03 VITALS — HEIGHT: 65 IN | BODY MASS INDEX: 25.83 KG/M2 | WEIGHT: 155 LBS

## 2025-03-03 DIAGNOSIS — M51.26 OTHER INTERVERTEBRAL DISC DISPLACEMENT, LUMBAR REGION: ICD-10-CM

## 2025-03-03 DIAGNOSIS — M48.061 SPINAL STENOSIS, LUMBAR REGION WITHOUT NEUROGENIC CLAUDICATION: ICD-10-CM

## 2025-03-03 PROCEDURE — 99214 OFFICE O/P EST MOD 30 MIN: CPT

## 2025-03-03 RX ORDER — PREGABALIN 75 MG/1
75 CAPSULE ORAL TWICE DAILY
Qty: 60 | Refills: 2 | Status: ACTIVE | COMMUNITY
Start: 2025-03-03 | End: 1900-01-01

## 2025-03-03 RX ORDER — OXYCODONE AND ACETAMINOPHEN 5; 325 MG/1; MG/1
5-325 TABLET ORAL EVERY 6 HOURS
Qty: 120 | Refills: 0 | Status: ACTIVE | COMMUNITY
Start: 2025-03-03 | End: 1900-01-01

## 2025-04-02 ENCOUNTER — APPOINTMENT (OUTPATIENT)
Dept: ORTHOPEDIC SURGERY | Facility: CLINIC | Age: 60
End: 2025-04-02
Payer: COMMERCIAL

## 2025-04-02 VITALS — WEIGHT: 155 LBS | BODY MASS INDEX: 25.83 KG/M2 | HEIGHT: 65 IN

## 2025-04-02 DIAGNOSIS — M79.10 MYALGIA, UNSPECIFIED SITE: ICD-10-CM

## 2025-04-02 DIAGNOSIS — M54.16 RADICULOPATHY, LUMBAR REGION: ICD-10-CM

## 2025-04-02 DIAGNOSIS — M48.061 SPINAL STENOSIS, LUMBAR REGION WITHOUT NEUROGENIC CLAUDICATION: ICD-10-CM

## 2025-04-02 PROCEDURE — 20553 NJX 1/MLT TRIGGER POINTS 3/>: CPT

## 2025-04-02 PROCEDURE — 99214 OFFICE O/P EST MOD 30 MIN: CPT | Mod: 25

## 2025-04-02 RX ORDER — OXYCODONE AND ACETAMINOPHEN 5; 325 MG/1; MG/1
5-325 TABLET ORAL EVERY 6 HOURS
Qty: 120 | Refills: 0 | Status: ACTIVE | COMMUNITY
Start: 2025-04-02 | End: 1900-01-01

## 2025-04-02 RX ORDER — METHYLPREDNISOLONE 4 MG/1
4 TABLET ORAL
Qty: 1 | Refills: 0 | Status: ACTIVE | COMMUNITY
Start: 2025-04-02 | End: 1900-01-01

## 2025-04-28 RX ORDER — OXYCODONE AND ACETAMINOPHEN 5; 325 MG/1; MG/1
5-325 TABLET ORAL EVERY 6 HOURS
Qty: 120 | Refills: 0 | Status: ACTIVE | COMMUNITY
Start: 2025-04-28 | End: 1900-01-01

## 2025-04-28 RX ORDER — PREGABALIN 75 MG/1
75 CAPSULE ORAL TWICE DAILY
Qty: 60 | Refills: 1 | Status: ACTIVE | COMMUNITY
Start: 2025-04-28 | End: 1900-01-01

## 2025-05-28 RX ORDER — PREGABALIN 75 MG/1
75 CAPSULE ORAL TWICE DAILY
Qty: 60 | Refills: 1 | Status: ACTIVE | COMMUNITY
Start: 2025-05-28 | End: 1900-01-01

## 2025-05-28 RX ORDER — OXYCODONE AND ACETAMINOPHEN 5; 325 MG/1; MG/1
5-325 TABLET ORAL EVERY 6 HOURS
Qty: 120 | Refills: 0 | Status: ACTIVE | COMMUNITY
Start: 2025-05-28 | End: 1900-01-01

## 2025-06-20 ENCOUNTER — APPOINTMENT (OUTPATIENT)
Dept: ORTHOPEDIC SURGERY | Facility: CLINIC | Age: 60
End: 2025-06-20
Payer: COMMERCIAL

## 2025-06-20 VITALS — BODY MASS INDEX: 25.83 KG/M2 | HEIGHT: 65 IN | WEIGHT: 155 LBS

## 2025-06-20 PROCEDURE — 99214 OFFICE O/P EST MOD 30 MIN: CPT | Mod: 25

## 2025-06-20 PROCEDURE — 20553 NJX 1/MLT TRIGGER POINTS 3/>: CPT

## 2025-06-20 RX ORDER — OXYCODONE 5 MG/1
5 TABLET ORAL 4 TIMES DAILY
Qty: 120 | Refills: 0 | Status: ACTIVE | COMMUNITY
Start: 2025-06-20 | End: 1900-01-01

## 2025-06-20 RX ORDER — NAPROXEN 500 MG/1
500 TABLET ORAL TWICE DAILY
Qty: 60 | Refills: 1 | Status: ACTIVE | COMMUNITY
Start: 2025-06-20 | End: 1900-01-01

## 2025-06-20 RX ORDER — PREGABALIN 75 MG/1
75 CAPSULE ORAL TWICE DAILY
Qty: 60 | Refills: 1 | Status: ACTIVE | COMMUNITY
Start: 2025-06-20 | End: 1900-01-01

## 2025-07-28 DIAGNOSIS — Z98.1 ARTHRODESIS STATUS: ICD-10-CM

## 2025-08-15 RX ORDER — OXYCODONE AND ACETAMINOPHEN 5; 325 MG/1; MG/1
5-325 TABLET ORAL EVERY 6 HOURS
Qty: 120 | Refills: 0 | Status: ACTIVE | COMMUNITY
Start: 2025-08-15 | End: 1900-01-01

## 2025-08-15 RX ORDER — PREGABALIN 75 MG/1
75 CAPSULE ORAL TWICE DAILY
Qty: 60 | Refills: 2 | Status: ACTIVE | COMMUNITY
Start: 2025-08-15 | End: 1900-01-01

## 2025-08-29 ENCOUNTER — APPOINTMENT (OUTPATIENT)
Dept: ORTHOPEDIC SURGERY | Facility: CLINIC | Age: 60
End: 2025-08-29
Payer: COMMERCIAL

## 2025-08-29 VITALS — HEIGHT: 65 IN | BODY MASS INDEX: 26.66 KG/M2 | WEIGHT: 160 LBS

## 2025-08-29 DIAGNOSIS — S39.012A STRAIN OF MUSCLE, FASCIA AND TENDON OF LOWER BACK, INITIAL ENCOUNTER: ICD-10-CM

## 2025-08-29 PROCEDURE — 99214 OFFICE O/P EST MOD 30 MIN: CPT

## 2025-08-29 PROCEDURE — 72100 X-RAY EXAM L-S SPINE 2/3 VWS: CPT

## 2025-08-29 RX ORDER — OXYCODONE AND ACETAMINOPHEN 5; 325 MG/1; MG/1
5-325 TABLET ORAL EVERY 6 HOURS
Qty: 120 | Refills: 0 | Status: ACTIVE | COMMUNITY
Start: 2025-08-29 | End: 1900-01-01

## 2025-08-29 RX ORDER — METHOCARBAMOL 750 MG/1
750 TABLET, FILM COATED ORAL 4 TIMES DAILY
Qty: 120 | Refills: 1 | Status: ACTIVE | COMMUNITY
Start: 2025-08-29 | End: 1900-01-01